# Patient Record
Sex: FEMALE | Race: WHITE | URBAN - METROPOLITAN AREA
[De-identification: names, ages, dates, MRNs, and addresses within clinical notes are randomized per-mention and may not be internally consistent; named-entity substitution may affect disease eponyms.]

---

## 2018-06-08 ENCOUNTER — OFFICE VISIT (OUTPATIENT)
Dept: INTERNAL MEDICINE CLINIC | Age: 58
End: 2018-06-08

## 2018-06-08 VITALS
DIASTOLIC BLOOD PRESSURE: 79 MMHG | WEIGHT: 134 LBS | SYSTOLIC BLOOD PRESSURE: 135 MMHG | OXYGEN SATURATION: 99 % | HEIGHT: 63 IN | HEART RATE: 66 BPM | TEMPERATURE: 97.8 F | BODY MASS INDEX: 23.74 KG/M2 | RESPIRATION RATE: 18 BRPM

## 2018-06-08 DIAGNOSIS — Z78.0 POSTMENOPAUSAL: ICD-10-CM

## 2018-06-08 DIAGNOSIS — Z01.84 IMMUNITY STATUS TESTING: ICD-10-CM

## 2018-06-08 DIAGNOSIS — Z12.31 SCREENING MAMMOGRAM, ENCOUNTER FOR: ICD-10-CM

## 2018-06-08 DIAGNOSIS — L40.9 PSORIASIS: ICD-10-CM

## 2018-06-08 DIAGNOSIS — L71.9 ROSACEA: Primary | ICD-10-CM

## 2018-06-08 RX ORDER — ESTRADIOL 0.04 MG/D
1 FILM, EXTENDED RELEASE TRANSDERMAL
COMMUNITY
End: 2018-06-08 | Stop reason: SDUPTHER

## 2018-06-08 RX ORDER — ESTRADIOL 0.04 MG/D
1 FILM, EXTENDED RELEASE TRANSDERMAL 2 TIMES WEEKLY
Qty: 8 PATCH | Refills: 5 | Status: SHIPPED | OUTPATIENT
Start: 2018-06-08 | End: 2018-12-27 | Stop reason: SDUPTHER

## 2018-06-08 RX ORDER — AMLODIPINE BESYLATE 2.5 MG/1
TABLET ORAL DAILY
COMMUNITY
End: 2018-06-08 | Stop reason: ALTCHOICE

## 2018-06-08 RX ORDER — METRONIDAZOLE 7.5 MG/G
CREAM TOPICAL 2 TIMES DAILY
COMMUNITY
End: 2018-06-08 | Stop reason: SDUPTHER

## 2018-06-08 RX ORDER — MYCOPHENOLATE MOFETIL 500 MG/1
500 TABLET ORAL 2 TIMES DAILY
COMMUNITY

## 2018-06-08 RX ORDER — PANTOPRAZOLE SODIUM 40 MG/1
40 TABLET, DELAYED RELEASE ORAL DAILY
COMMUNITY

## 2018-06-08 RX ORDER — METRONIDAZOLE 7.5 MG/G
CREAM TOPICAL 2 TIMES DAILY
Qty: 45 G | Refills: 5 | Status: SHIPPED | OUTPATIENT
Start: 2018-06-08

## 2018-06-08 RX ORDER — GABAPENTIN 300 MG/1
300 CAPSULE ORAL 3 TIMES DAILY
COMMUNITY

## 2018-06-08 NOTE — PATIENT INSTRUCTIONS
Request records from [facility] - Dr Lorrie Sandra rheumatologist - recent blood work done request records    Records of colonoscopy - Dr Mark Shepherd

## 2018-06-08 NOTE — MR AVS SNAPSHOT
[address] [facility] [address] [facility] 
[phone] Patient: Valentine Wilson MRN: [id number] RTZ:[id number] Visit Information Date & Time Provider Department Dept. Phone Encounter #  
 6/8/2018  3:00 PM Britni Malik, [facility] [phone] [id number] Follow-up Instructions Return in about 6 months (around 12/8/2018) for pap smear . Upcoming Health Maintenance Date Due Hepatitis C Screening 1960 DTaP/Tdap/Td series (1 - Tdap) 4/21/1980 PAP AKA CERVICAL CYTOLOGY 4/21/1980 BREAST CANCER SCRN MAMMOGRAM 4/21/2009 FOBT Q 1 YEAR AGE 50-75 4/21/2009 Influenza Age 5 to Adult 8/1/2018 Allergies as of 6/8/2018  Review Complete On: 6/8/2018 By: Katy Mckeon MD  
  
 Severity Noted Reaction Type Reactions Sulfa (Sulfonamide Antibiotics)  06/08/2018    Hives Current Immunizations  Never Reviewed No immunizations on file. Not reviewed this visit You Were Diagnosed With   
  
 Codes Comments Rosacea    -  Primary ICD-10-CM: L71.9 ICD-9-CM: 695.3 Psoriasis     ICD-10-CM: L40.9 ICD-9-CM: 696.1 Postmenopausal     ICD-10-CM: Z78.0 ICD-9-CM: V49.81 Screening mammogram, encounter for     ICD-10-CM: Z12.31 
ICD-9-CM: V76.12 Vitals BP Pulse Temp Resp Height(growth percentile) Weight(growth percentile) 135/79 (BP 1 Location: Right arm, BP Patient Position: Sitting) 66 97.8 °F (36.6 °C) (Oral) 18 5' 3\" (1.6 m) 134 lb (60.8 kg) SpO2 BMI OB Status Smoking Status 99% 23.74 kg/m2 Hysterectomy Never Smoker Vitals History BMI and BSA Data Body Mass Index Body Surface Area  
 23.74 kg/m 2 1.64 m 2 Preferred Pharmacy Pharmacy Name Phone [phone] [facility] [phone] Your Updated Medication List  
  
   
 This list is accurate as of 18  3:37 PM.  Always use your most recent med list.  
  
  
  
  
 CELLCEPT 500 mg tablet Generic drug:  mycophenolate Take 500 mg by mouth two (2) times a day. COSENTYX  mg/mL Pnij Generic drug:  secukinumab  
by SubCUTAneous route. estradiol 0.0375 mg/24 hr  
Commonly known as:  VIVELLE  
1 Patch by TransDERmal route two (2) times a week.  
  
 gabapentin 300 mg capsule Commonly known as:  NEURONTIN Take 300 mg by mouth three (3) times daily. metroNIDAZOLE 0.75 % topical cream  
Commonly known as:  METROCREAM  
Apply  to affected area two (2) times a day. Use a thin layer to affected areas after washing PROTONIX 40 mg tablet Generic drug:  pantoprazole Take 40 mg by mouth daily. Prescriptions Sent to Pharmacy Refills  
 estradiol (VIVELLE) 0.0375 mg/24 hr 5 Si Patch by TransDERmal route two (2) times a week. Class: Normal  
 Pharmacy: [address] Ph #: [phone] Route: TransDERmal  
 metroNIDAZOLE (METROCREAM) 0.75 % topical cream 5 Sig: Apply  to affected area two (2) times a day. Use a thin layer to affected areas after washing Class: Normal  
 Pharmacy: [address] Ph #: [phone] Route: Topical  
  
Follow-up Instructions Return in about 6 months (around 2018) for pap smear . To-Do List   
 2018 Imaging:  [facility] MAMMO BI SCREENING INCL CAD Patient Instructions Request records from [facility] - Dr Filemon Holland rheumatologist - recent blood work done request records Records of colonoscopy - Dr Adriane Lawler Introducing [facility]! Rebecca Garcia introduces [email or web address] patient portal. Now you can access parts of your medical record, email your doctor's office, and request medication refills online. 1. In your internet browser, go to [email or web address] 2. Click on the First Time User? Click Here link in the Sign In box. You will see the New Member Sign Up page. 3. Enter your Oony Access Code exactly as it appears below. You will not need to use this code after youve completed the sign-up process. If you do not sign up before the expiration date, you must request a new code. · Oony Access Code: [email or web address] Expires: 9/6/2018  3:37 PM 
 
4. Enter the last four digits of your Social Security Number (xxxx) and Date of Birth (mm/dd/yyyy) as indicated and click Submit. You will be taken to the next sign-up page. 5. Create a ProcessUnityt ID. This will be your Oony login ID and cannot be changed, so think of one that is secure and easy to remember. 6. Create a Oony password. You can change your password at any time. 7. Enter your Password Reset Question and Answer. This can be used at a later time if you forget your password. 8. Enter your e-mail address. You will receive e-mail notification when new information is available in [address] 9. Click Sign Up. You can now view and download portions of your medical record. 10. Click the Download Summary menu link to download a portable copy of your medical information. If you have questions, please visit the Frequently Asked Questions section of the Oony website. Remember, Oony is NOT to be used for urgent needs. For medical emergencies, dial 911. Now available from your iPhone and Android! Please provide this summary of care documentation to your next provider. Your primary care clinician is listed as MARCELO ELENA [address] [place] [address]. If you have any questions after today's visit, please call [phone].

## 2018-06-08 NOTE — PROGRESS NOTES
Reviewed record in preparation for visit and have obtained necessary documentation. Identified pt with two pt identifiers(name and ). Chief Complaint   Patient presents with   [facility] Maintenance Due   Topic Date Due    Hepatitis C Test  1960    DTaP/Tdap/Td  (1 - Tdap) 1980    Cervical Cancer Screening  1980    Breast Cancer Screening  2009    Stool testing for trace blood  2009       Ms. Abebe Redmond has a reminder for a \"due or due soon\" health maintenance. I have asked that she discuss this further with her primary care provider for follow-up on this health maintenance. Coordination of Care Questionnaire:  :     1) Have you been to an emergency room, urgent care clinic since your last visit? no   Hospitalized since your last visit? no             2) Have you seen or consulted any other health care providers outside of [address] [place] [address] since your last visit? no  (Include any pap smears or colon screenings in this section.)    3) In the event something were to happen to you and you were unable to speak on your behalf, do you have an Advance Directive/ Living Will in place stating your wishes? NO    Do you have an Advance Directive on file? no    4) Are you interested in receiving information on Advance Directives? NO    Patient is accompanied by self I have received verbal consent from Rosa Vogel to discuss any/all medical information while they are present in the room.

## 2018-06-08 NOTE — PROGRESS NOTES
Ms. Avelino Stanley is a new patient who is here to establish care. 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 Currently on Cellcept and cosentyx   Currently pain in joints is controlled with this therapy. Dr Atilio Padilla     Patient has rosacea  - currently on metronidazole cream     Postmenopausal symptoms, and painful sexual intercourse -     GERD stopped Protonix temporarily - and had severe GERD. 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    She had a colonoscopy done in [place] and were requesting records  Review of systems:  Constitutional: negative for fever, chills, weight loss, night sweats   Eyes : negative for vision changes, eye pain and discharge  Nose and Throat: negative for tinnitus, sore throat   Cardiovascular: negative for chest pain, palpitations and shortness of breath  Respiratory: negative for shortness of breath, cough and wheezing   Gastroinstestinal: negative for abdominal pain, nausea, vomiting, diarrhea, constipation, and blood in the stool  Musculoskeletal: See HPI  Genitourinary: negative for dysuria, nocturia, polyuria and hematuria   Neurologic: Negative for focal weakness, numbness or incoordination  Skin: negative for rash, pruritus  Hematologic: negative for easy bruising      Past Medical History:   Diagnosis Date    C. difficile diarrhea     Drug-induced lupus erythematosus     ramicade induced    HTN (hypertension)     Psoriasis     RA [facility]rhe[facility]oid arthritis) ([facility] [address]     Rosacea         History reviewed. No pertinent surgical history. Allergies   Allergen Reactions    Sulfa (Sulfonamide Antibiotics) Hives       No current outpatient prescriptions on file prior to visit. No current facility-administered medications on file prior to visit. family history includes Alcohol abuse in her mother; Cancer in her father. Social History     Social History    Marital status:      Spouse name: N/A    Number of children: N/A    Years of education: N/A     Occupational History    Not on file. Social History Main Topics    Smoking status: Never Smoker    Smokeless tobacco: Never Used    Alcohol use Yes    Drug use: No    Sexual activity: Yes     Partners: Male     Other Topics Concern    Not on file     Social History Narrative    No narrative on file       Visit Vitals    /79 (BP 1 Location: Right arm, BP Patient Position: Sitting)    Pulse 66    Temp 97.8 °F (36.6 °C) (Oral)    Resp 18    Ht 5' 3\" (1.6 m)    Wt 134 lb (60.8 kg)    SpO2 99%    BMI 23.74 kg/m2     General:  Well appearing female no acute distress  HEENT:   PERRL,normal conjunctiva. External ear and canals normal, TMs normal.  Hearing normal to voice. Nose without edema or discharge, normal septum. Lips, teeth, gums normal.  Oropharynx: no erythema, no exudates, no lesions, normal tongue. Neck:  Supple. Thyroid normal size, nontender, without nodules. No carotid bruit. No masses or lymphadenopathy  Respiratory: no respiratory distress,  no wheezing, no rhonchi, no rales. No chest wall tenderness. Cardiovascular:  RRR, normal S1S2, no murmur. Gastrointestinal: normal bowel sounds, soft, nontender, without masses. No hepatosplenomegaly. Extremities +2 pulses, no edema, normal sensation   Musculoskeletal:  Normal gait. Normal digits and nails. Normal strength and tone, no atrophy, and no abnormal movement. Skin:  No rash, no lesions, no ulcers. Skin warm, normal turgor, without induration or nodules. Neuro:  A and OX4, fluent speech, cranial nerves normal 2-12. Sensation normal to light touch.   DTR symmetrical  Psych:  Normal affect      No results found for: WBC, WBCLT, HGBPOC, HGB, HGBP, HCTPOC, HCT, PHCT, RBCH, PLT, MCV, HGBEXT, HCTEXT, PLTEXT, HGBEXT, HCTEXT, PLTEXT  No results found for: NA, K, CL, CO2, AGAP, GLU, BUN, CREA, BUCR, GFRAA, GFRNA, CA, GFRAA  No results found for: CHOL, CHOLPOCT, CHOLX, CHLST, CHOLV, HDL, HDLPOC, LDL, LDLCPOC, LDLC, DLDLP, VLDLC, VLDL, TGLX, TRIGL, TRIGP, TGLPOCT, CHHD, CHHDX  No results found for: TSH, TSH2, TSH3, TSHP, TSHEXT, TSHEXT  No results found for: HBA1C, HGBE8, WRP9MNZA, YID6TCSA, LPO2XYIA  No results found for: VITD3, XQVID2, XQVID3, XQVID, VD3RIA                Assessment and Plan:   65-year-old woman with history of rheumatoid arthritis, Remicade induced lupus, psoriasis presenting to establish care. 1. Rosacea  Patient has been using metronidazole cream for a long time and would like a refill.  - metroNIDAZOLE (METROCREAM) 0.75 % topical cream; Apply  to affected area two (2) times a day. Use a thin layer to affected areas after washing  Dispense: 45 g; Refill:    2. Autoimmune disease. Rheumatoid arthritis, Remicade and just lupus and psoriasis  Followed by rheumatologist at [address] [place] [address] I requested records  Patient is on CellCept and Cesentyx she had labs done last week      3. Postmenopausal  After menopause she had vaginal dryness causing pain during sexual intercourse which improved with estradiol. Patient has been on this for years. She knows that it increases her risk for heart disease and clots. She would like to continue with therapy. - estradiol (VIVELLE) 0.0375 mg/24 hr; 1 Patch by TransDERmal route two (2) times a week. Dispense: 8 Patch; Refill: 5    4. Screening mammogram, encounter for  - [facility] MAMMO BI SCREENING INCL CAD; Future    5. Immunity status testing  Needs testing for hep B  - HEP B SURFACE AB    Follow-up Disposition:  Return in about 6 months (around 12/8/2018) for pap smear .      Rajni Thompson MD

## 2018-06-09 LAB — HBV SURFACE AB SER QL: NON REACTIVE

## 2018-06-11 NOTE — PROGRESS NOTES
Hep B testing for immunity shows that patient is not immune to hep B and we need to repeat hep B vaccine.  Can make nurse appointment for vaccine

## 2018-10-08 ENCOUNTER — OFFICE VISIT (OUTPATIENT)
Dept: INTERNAL MEDICINE CLINIC | Age: 58
End: 2018-10-08

## 2018-10-08 ENCOUNTER — HOSPITAL ENCOUNTER (OUTPATIENT)
Dept: LAB | Age: 58
Discharge: HOME | End: 2018-10-08
Payer: COMMERCIAL

## 2018-10-08 VITALS
RESPIRATION RATE: 18 BRPM | TEMPERATURE: 97.8 F | OXYGEN SATURATION: 99 % | DIASTOLIC BLOOD PRESSURE: 92 MMHG | BODY MASS INDEX: 23.74 KG/M2 | HEART RATE: 67 BPM | SYSTOLIC BLOOD PRESSURE: 150 MMHG | WEIGHT: 134 LBS | HEIGHT: 63 IN

## 2018-10-08 DIAGNOSIS — Z01.419 WOMEN'S ANNUAL ROUTINE GYNECOLOGICAL EXAMINATION: Primary | ICD-10-CM

## 2018-10-08 DIAGNOSIS — Z12.31 SCREENING MAMMOGRAM, ENCOUNTER FOR: ICD-10-CM

## 2018-10-08 DIAGNOSIS — Z11.1 SCREENING FOR TUBERCULOSIS: ICD-10-CM

## 2018-10-08 DIAGNOSIS — Z13.220 SCREENING FOR CHOLESTEROL LEVEL: ICD-10-CM

## 2018-10-08 DIAGNOSIS — Z11.59 NEED FOR HEPATITIS C SCREENING TEST: ICD-10-CM

## 2018-10-08 PROBLEM — M32.9 SYSTEMIC LUPUS ERYTHEMATOSUS (HCC): Status: ACTIVE | Noted: 2018-10-08

## 2018-10-08 PROCEDURE — 87624 HPV HI-RISK TYP POOLED RSLT: CPT

## 2018-10-08 PROCEDURE — 88175 CYTOPATH C/V AUTO FLUID REDO: CPT

## 2018-10-08 NOTE — PROGRESS NOTES
Reviewed record in preparation for visit and have obtained necessary documentation. Identified pt with two pt identifiers(name and ). Chief Complaint Patient presents with  Well Woman  Immunization/Injection  
  tb, hep Health Maintenance Due Topic Date Due  
 Hepatitis C Test  1960  Cervical Cancer Screening  1980  Shingles Vaccine (1 of 2) 2009  Breast Cancer Screening  2009  Stool testing for trace blood  2009 Ms. Lurdes Duncan has a reminder for a \"due or due soon\" health maintenance. I have asked that she discuss this further with her primary care provider for follow-up on this health maintenance. Coordination of Care Questionnaire: 
:  
 
1) Have you been to an emergency room, urgent care clinic since your last visit? no  
Hospitalized since your last visit? no          
 
2) Have you seen or consulted any other health care providers outside of [address] [place] [address] since your last visit? no  (Include any pap smears or colon screenings in this section.) 3) In the event something were to happen to you and you were unable to speak on your behalf, do you have an Advance Directive/ Living Will in place stating your wishes? NO Do you have an Advance Directive on file? no 
 
4) Are you interested in receiving information on Advance Directives? NO Patient is accompanied by self I have received verbal consent from Cici Bobby to discuss any/all medical information while they are present in the room.

## 2018-10-08 NOTE — LETTER
NOTIFICATION  
 
10/8/2018 10:23 AM 
 
Ms. Cici Bobby [address] [place] [address] [address] [address] To Whom It May Concern: 
 
Cici Bboby is currently under the care of [facility]. Patient has completed 5 vaccines for hepatitis B in the past. Unfortunately patient does not seroconvert to immune status. Patient has a history of autoimmune disease ( Sjogren's and Lupus) and it has been reported that this subset of patients do not develop antibodies to hepatitis B vaccine. At this point I do not recommend further vaccination for Hepatitis B. If there are questions or concerns please have the patient contact our office. Sincerely, Holly Sweet MD

## 2018-10-08 NOTE — PROGRESS NOTES
Ms. Marvin Todd is presenting to follow up on chronic medical issues CC: 
Well Woman and Immunization/Injection (tb, hep) HPI: 
 
Saw dr Art Jimenez and plans to have colonoscopy 2019 Had abnormal cells in the past  
Sexually active one partner, and has a hx of vaginal dryness which improves with estradiol patch She has had 5 vaccinations for Hep B but has not seroconverted. Advised against further vaccination with Hep B. She has a hx of autoimmune disorder - lupus and Sjogren and psoriasis. Requesting a letter for work stating patient no longer needs to be vaccinated for Hep B She had a colonoscopy done in [place] unfortunately I dont have records - previously requested She saw Dr Art Jimenez and plans to have colonoscopy next year Had hysterectomy / partial 
 
Review of systems: 
 
10 systems reviewed and negative other than HPI Past Medical History:  
Diagnosis Date  C. difficile diarrhea  Drug-induced lupus erythematosus   
 ramicade induced  HTN (hypertension)  Psoriasis  RA (rheumatoid arthritis) ([facility] [address]  Rosacea Past Surgical History:  
Procedure Laterality Date  HX PARTIAL HYSTERECTOMY  2013  
 had fibroids Allergies Allergen Reactions  Sulfa (Sulfonamide Antibiotics) Hives Current Outpatient Prescriptions on File Prior to Visit Medication Sig Dispense Refill  mycophenolate (CELLCEPT) 500 mg tablet Take 500 mg by mouth two (2) times a day.  secukinumab (COSENTYX PEN) 150 mg/mL pnij by SubCUTAneous route.  gabapentin (NEURONTIN) 300 mg capsule Take 300 mg by mouth three (3) times daily.  pantoprazole (PROTONIX) 40 mg tablet Take 40 mg by mouth daily.  estradiol (VIVELLE) 0.0375 mg/24 hr 1 Patch by TransDERmal route two (2) times a week. 8 Patch 5  
 metroNIDAZOLE (METROCREAM) 0.75 % topical cream Apply  to affected area two (2) times a day.  Use a thin layer to affected areas after washing 45 g 5  
 
 No current facility-administered medications on file prior to visit. family history includes Alcohol abuse in her mother; Cancer in her father. Social History Social History  Marital status:  Spouse name: N/A  
 Number of children: N/A  
 Years of education: N/A Occupational History  Not on file. Social History Main Topics  Smoking status: Never Smoker  Smokeless tobacco: Never Used  Alcohol use Yes  Drug use: No  
 Sexual activity: Yes  
  Partners: Male Other Topics Concern  Not on file Social History Narrative Visit Vitals  BP (!) 150/92 (BP 1 Location: Right arm, BP Patient Position: Sitting)  Pulse 67  Temp 97.8 °F (36.6 °C) (Oral)  Resp 18  Ht 5' 3\" (1.6 m)  Wt 134 lb (60.8 kg)  SpO2 99%  BMI 23.74 kg/m2 General:  Well appearing female no acute distress HEENT:   PERRL,normal conjunctiva. External ear and canals normal, TMs normal.  Hearing normal to voice. Nose without edema or discharge, normal septum. Lips, teeth, gums normal.  Oropharynx: no erythema, no exudates, no lesions, normal tongue. Neck:  Supple. Thyroid normal size, nontender, without nodules. No carotid bruit. No masses or lymphadenopathy Respiratory: no respiratory distress,  no wheezing, no rhonchi, no rales. No chest wall tenderness. Cardiovascular:  RRR, normal S1S2, no murmur. Gastrointestinal: normal bowel sounds, soft, nontender, without masses. No hepatosplenomegaly. Extremities +2 pulses, no edema, normal sensation Musculoskeletal:  Normal gait. Normal digits and nails. Normal strength and tone, no atrophy, and no abnormal movement. Skin:  No rash, no lesions, no ulcers. Skin warm, normal turgor, without induration or nodules. Neuro:  A and OX4, fluent speech, cranial nerves normal 2-12. Sensation normal to light touch. DTR symmetrical 
Psych:  Normal affect Pelvic- normal appearing external genitalia, speculum normal appearing cervix, no abnormal discharge, pap done. Bimanual exam, no cervical motion tenderness, no adnexal tenderness or masses. Assessment and Plan:  
58-year-old woman with history of rheumatoid arthritis, Remicade induced lupus, psoriasis presenting to for womans exam  
 
 1. Women's annual routine gynecological examination 
- PAP IG, APTIMA HPV AND RFX 16/18,45 (200020) 2. Screening for tuberculosis - QUANTIFERON-TB GOLD PLUS 3. Need for hepatitis C screening test 
- HEPATITIS C AB 4. Screening mammogram, encounter for - [facility] MAMMO BI SCREENING INCL CAD; Future 5. Screening for cholesterol level - LIPID PANEL 6. Autoimmune disease Remicade and just lupus and psoriasis Followed by rheumatologist at [facility] I reviewed records and advised to continue current therapy Patient is on CellCept and Cesentyx she had labs done last week 7. Postmenopausal 
After menopause she had vaginal dryness causing pain during sexual intercourse which improved with estradiol. Patient has been on this for years. She knows that it increases her risk for heart disease and clots. She would like to continue with therapy. - estradiol (VIVELLE) 0.0375 mg/24 hr; 1 Patch by TransDERmal route two (2) times a week. Dispense: 8 Patch; Refill: 5 
 
8. Screening mammogram, encounter for - [facility] MAMMO BI SCREENING INCL CAD; Future Will have colonoscopy in 2019 with Dr Jayne Anguiano Follow-up Disposition: 
Return in about 1 year (around 10/8/2019) for general exam . Nba Barrientos MD

## 2018-10-09 LAB
CHOLEST SERPL-MCNC: 197 MG/DL (ref 100–199)
HCV AB S/CO SERPL IA: <0.1 S/CO RATIO (ref 0–0.9)
HDLC SERPL-MCNC: 86 MG/DL
LDLC SERPL CALC-MCNC: 95 MG/DL (ref 0–99)
TRIGL SERPL-MCNC: 81 MG/DL (ref 0–149)
VLDLC SERPL CALC-MCNC: 16 MG/DL (ref 5–40)

## 2018-10-11 ENCOUNTER — HOSPITAL ENCOUNTER (OUTPATIENT)
Dept: MAMMOGRAPHY | Age: 58
Discharge: HOME | End: 2018-10-11
Attending: INTERNAL MEDICINE
Payer: COMMERCIAL

## 2018-10-11 DIAGNOSIS — Z12.31 SCREENING MAMMOGRAM, ENCOUNTER FOR: ICD-10-CM

## 2018-10-11 PROCEDURE — 77067 SCR MAMMO BI INCL CAD: CPT

## 2018-10-20 LAB
GAMMA INTERFERON BACKGROUND BLD IA-ACNC: 0.03 IU/ML
M TB IFN-G BLD-IMP: NEGATIVE
M TB IFN-G CD4+ BCKGRND COR BLD-ACNC: 0.04 IU/ML
MITOGEN IGNF BLD-ACNC: 0.58 IU/ML
QUANTIFERON INCUBATION, QF1T: NORMAL
QUANTIFERON TB2 AG: 0.03 IU/ML
SERVICE CMNT-IMP: NORMAL

## 2018-12-26 DIAGNOSIS — Z78.0 POSTMENOPAUSAL: ICD-10-CM

## 2018-12-27 DIAGNOSIS — Z78.0 POSTMENOPAUSAL: ICD-10-CM

## 2018-12-27 RX ORDER — ESTRADIOL 0.04 MG/D
1 FILM, EXTENDED RELEASE TRANSDERMAL 2 TIMES WEEKLY
Qty: 8 PATCH | Refills: 5 | Status: SHIPPED | OUTPATIENT
Start: 2018-12-28 | End: 2019-06-06 | Stop reason: SDUPTHER

## 2018-12-27 RX ORDER — ESTRADIOL 0.04 MG/D
FILM, EXTENDED RELEASE TRANSDERMAL
Qty: 8 PATCH | Refills: 5 | Status: SHIPPED | OUTPATIENT
Start: 2018-12-27 | End: 2019-06-06 | Stop reason: SDUPTHER

## 2019-06-06 DIAGNOSIS — Z78.0 POSTMENOPAUSAL: ICD-10-CM

## 2019-06-06 RX ORDER — ESTRADIOL 0.04 MG/D
1 FILM, EXTENDED RELEASE TRANSDERMAL
Qty: 8 PATCH | Refills: 5 | Status: SHIPPED | OUTPATIENT
Start: 2019-06-10 | End: 2019-08-07 | Stop reason: SDUPTHER

## 2019-08-07 DIAGNOSIS — Z78.0 POSTMENOPAUSAL: ICD-10-CM

## 2019-08-08 RX ORDER — ESTRADIOL 0.04 MG/D
1 FILM, EXTENDED RELEASE TRANSDERMAL
Qty: 8 PATCH | Refills: 5 | Status: SHIPPED | OUTPATIENT
Start: 2019-08-12 | End: 2019-08-20 | Stop reason: SDUPTHER

## 2019-08-20 DIAGNOSIS — Z78.0 POSTMENOPAUSAL: ICD-10-CM

## 2019-08-20 RX ORDER — ESTRADIOL 0.04 MG/D
1 FILM, EXTENDED RELEASE TRANSDERMAL
Qty: 8 PATCH | Refills: 5 | Status: SHIPPED | OUTPATIENT
Start: 2019-08-26 | End: 2020-09-09

## 2019-08-20 NOTE — TELEPHONE ENCOUNTER
PCP: Montana Jay MD    Last appt: 10/8/2018  No future appointments. Requested Prescriptions     Pending Prescriptions Disp Refills    estradiol (VIVELLE) 0.0375 mg/24 hr 8 Patch 5     Si Patch by TransDERmal route every Monday.

## 2019-09-12 ENCOUNTER — TELEPHONE (OUTPATIENT)
Dept: INTERNAL MEDICINE CLINIC | Age: 59
End: 2019-09-12

## 2019-09-12 NOTE — TELEPHONE ENCOUNTER
----- Message from Israel Matta sent at 9/12/2019 10:47 AM EDT -----  Regarding: Dr. Remington Caraballo: [phone]  Appointment Request From: Desi Mike    With Provider: Luke Hyde MD [facility]]    Preferred Date Range: 9/27/2019 - 10/1/2019    Preferred Times: Any time    Reason for visit: Request an Appointment    Comments:  Annual Check-up, Some Back Pain, GERD with sore throat.       Copy/paste envera

## 2020-04-03 ENCOUNTER — VIRTUAL VISIT (OUTPATIENT)
Dept: INTERNAL MEDICINE CLINIC | Age: 60
End: 2020-04-03

## 2020-04-03 DIAGNOSIS — N95.9 POST MENOPAUSAL PROBLEMS: ICD-10-CM

## 2020-04-03 DIAGNOSIS — Z78.0 POSTMENOPAUSAL: Primary | ICD-10-CM

## 2020-04-03 DIAGNOSIS — L71.9 ROSACEA: ICD-10-CM

## 2020-04-03 DIAGNOSIS — Z12.39 SCREENING FOR BREAST CANCER: ICD-10-CM

## 2020-04-03 NOTE — PROGRESS NOTES
Ms. 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 Currently on Cellcept and cosentyx   Denies joint pain this therapy is working well for patient   Dr Jody Newberry is following patient     Patient has rosacea  - currently on metronidazole cream     Postmenopausal symptoms, well controlled with the estradiol patch. [place] of increase risk of clots with this medication     GERD stopped Protonix temporarily - and had severe GERD. 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 difficile diarrhea     Drug-induced lupus erythematosus     ramicade induced    HTN (hypertension)     Psoriasis     RA [facility]rheumatoid [facility]) [facility] [address]     Rosacea         Past Surgical History:   Procedure Laterality Date    HX PARTIAL HYSTERECTOMY  2013    had fibroids       Allergies   Allergen Reactions    Diphenhydramine Hcl Palpitations    Sulfa (Sulfonamide Antibiotics) Hives and Rash       Current Outpatient Medications on File Prior to Visit   Medication Sig Dispense Refill    estradiol (VIVELLE) 0.0375 mg/24 hr 1 Patch by TransDERmal route every Monday. 8 Patch 5    mycophenolate (CELLCEPT) 500 mg tablet Take 500 mg by mouth two (2) times a day.  secukinumab (COSENTYX PEN) 150 mg/mL pnij by SubCUTAneous route.  gabapentin (NEURONTIN) 300 mg capsule Take 300 mg by mouth three (3) times daily.       pantoprazole (PROTONIX) 40 mg tablet Take 40 mg by mouth daily.  metroNIDAZOLE (METROCREAM) 0.75 % topical cream Apply  to affected area two (2) times a day. Use a thin layer to affected areas after washing 45 g 5     No current facility-administered medications on file prior to visit. family history includes Alcohol abuse in her mother; Cancer in her father. Social History     Socioeconomic History    Marital status:      Spouse name: Not on file    Number of children: Not on file    Years of education: Not on file    Highest education level: Not on file   Occupational History    Not on file   Social Needs    Financial resource strain: Not on file    Food insecurity     Worry: Not on file     Inability: Not on file    Transportation needs     Medical: Not on file     Non-medical: Not on file   Tobacco Use    Smoking status: Never Smoker    Smokeless tobacco: Never Used   Substance and Sexual Activity    Alcohol use: Yes    Drug use: No    Sexual activity: Yes     Partners: Male   Lifestyle    Physical activity     Days per week: Not on file     Minutes per session: Not on file    Stress: Not on file   Relationships    Social connections     Talks on phone: Not on file     Gets together: Not on file     Attends [employer] service: Not on file     Active member of club or organization: Not on file     Attends meetings of clubs or organizations: Not on file     Relationship status: Not on file    Intimate partner violence     Fear of current or ex partner: Not on file     Emotionally abused: Not on file     Physically abused: Not on file     Forced sexual activity: Not on file   Other Topics Concern    Not on file   Social History Narrative    Not on file       There were no vitals taken for this visit. General:  Well appearing female no acute distress  HEENT:   normal conjunctiva. External ear normal  Neck:  Supple.    Respiratory: no respiratory distress,  Speaking in full sentences    Extremities  no edema  Musculoskeletal:  Normal gait  Skin:  No visible  rash  Neuro:  A and OX4, fluent speech, cranial nerves grossly intact    Psych:  Normal affect      No results found for: WBC, WBCLT, HGBPOC, HGB, HGBP, HCTPOC, HCT, PHCT, RBCH, PLT, MCV, HGBEXT, HCTEXT, PLTEXT, HGBEXT, HCTEXT, PLTEXT  No results found for: NA, K, CL, CO2, AGAP, GLU, BUN, CREA, BUCR, GFRAA, GFRNA, CA, GFRAA  Lab Results   Component Value Date/Time    Cholesterol, total 197 10/08/2018 10:47 AM    HDL Cholesterol 86 10/08/2018 10:47 AM    LDL, calculated 95 10/08/2018 10:47 AM    VLDL, calculated 16 10/08/2018 10:47 AM    Triglyceride 81 10/08/2018 10:47 AM     No results found for: TSH, TSH2, TSH3, TSHP, TSHEXT, TSHEXT  No results found for: HBA1C, HGBE8, IIU2QYLI, GLT8RAFM, TUC7FZMU  No results found for: VITD3, XQVID2, XQVID3, XQVID, VD3RIA                Assessment and Plan:   59-year-old woman with history of rheumatoid arthritis, Remicade induced lupus, psoriasis presenting to follow up   1. Rosacea  Patient has been using metronidazole cream for a long time with good results continue current therapy with - metroNIDAZOLE (METROCREAM) 0.75 % topical cream; Apply  to affected area two (2) times a day. 2.  Autoimmune disease. Rheumatoid arthritis, Remicade induced lupus and psoriasis  Followed by [facility]ologist at [facility] I requested records  Patient is on CellCept and Cesentyx she had labs done recently will request records       3. Postmenopausal  After menopause she had vaginal dryness causing pain during sexual intercourse which improved with estradiol. Patient has been on this for years. She knows that it increases her risk for heart disease and clots. She would like to continue with therapy. - estradiol (VIVELLE) 0.0375 mg/24 hr; 1 Patch by TransDERmal route two (2) times a week. 4. Screening mammogram, encounter for  - [facility] MAMMO BI SCREENING INCL CAD;  Future         This is an established visit conducted via real time video and audio telemedicine. The patient has been instructed that this meets HIPAA criteria and acknowledges and agrees to this method of visitation.     Reynaldo Mendez MD  04/03/20  11:49 AM    Reynaldo Mendez MD

## 2020-04-03 NOTE — PATIENT INSTRUCTIONS
Request records from [facility] - Dr Loan Sandoval rheumatologist - recent blood work done request records 
  
Records of colonoscopy - Dr Arsen Vance

## 2020-04-03 NOTE — PROGRESS NOTES
Reviewed record in preparation for visit and have obtained necessary documentation. Identified pt with two pt identifiers(name and ). Chief Complaint   Patient presents with    Follow-up       Health Maintenance Due   Topic Date Due    Colonoscopy  1977    Shingles Vaccine (1 of 2) 2009       Ms. Jody Rocha has a reminder for a \"due or due soon\" health maintenance. I have asked that she discuss this further with her primary care provider for follow-up on this health maintenance. Coordination of Care Questionnaire:  :     1) Have you been to an emergency room, urgent care clinic since your last visit? no   Hospitalized since your last visit? no             2) Have you seen or consulted any other health care providers outside of [address] [place] [address] since your last visit? no  (Include any pap smears or colon screenings in this section.)    3) In the event something were to happen to you and you were unable to speak on your behalf, do you have an Advance Directive/ Living Will in place stating your wishes? NO    Do you have an Advance Directive on file? no    4) Are you interested in receiving information on Advance Directives? NO    Patient is accompanied by self I have received verbal consent from Sendy Tyson to discuss any/all medical information while they are present in the room.

## 2020-09-09 DIAGNOSIS — Z78.0 POSTMENOPAUSAL: ICD-10-CM

## 2020-09-09 RX ORDER — ESTRADIOL 0.04 MG/D
PATCH TRANSDERMAL
Qty: 4 PATCH | Refills: 11 | Status: SHIPPED | OUTPATIENT
Start: 2020-09-09 | End: 2021-01-05 | Stop reason: SDUPTHER

## 2020-10-19 NOTE — PROGRESS NOTES
HISTORY OF PRESENT ILLNESS  Miriam Friedman is a 63 y.o. female. HPI     Pt normally follows with Dr Hanane Silva. Pt is here for acute care. This is an established visit completed with telemedicine was completed with video assist  the patient acknowledges and agrees to this method of visitation doxyme    She is currently in [place], she will be coming home tomorrow     She c/o elevated bp  Around 2-3 weeks ago she noticed facial flushing,minor Has  BP have been 1170/96 138/70 118/78 120/103  She has been steady 130-140s/90s   She was on bystolic 5 mg for BP for 3 years and came off of it ~2018  Will restart bystolic 5 mg 1/2 tab -- discussed if she is still having higher reading she can take full tab     Wt was 134 lbs lov    Reviewed labs  Ordered labs     She sees Dr. Rebecca Laurent (rheum) for RA and lupus for psoriatic arthritis is well  She is on cellcept and cosentyx has had blood work drawn there as well    She has protonix 40 mg for gerd controls her symptoms works well    She has gabapentin 300 mg for neuropathy and spasms, this works well     PREVENTIVE:  Lipids: 10/18 95        Patient Active Problem List    Diagnosis Date Noted    Systemic lupus erythematosus ([facility] [address] 10/08/2018    RA (rheumatoid arthritis) ([facility] [address]     Psoriasis     HTN (hypertension)     Rosacea      Current Outpatient Medications   Medication Sig Dispense Refill    nebivoloL (BYSTOLIC) 5 mg tablet Take 1 Tab by mouth daily. 30 Tab 2    estradioL (CLIMARA) 0.0375 mg/24 hr APPLY 1 PATCH EVERY MONDAY 4 Patch 11    mycophenolate (CELLCEPT) 500 mg tablet Take 500 mg by mouth two (2) times a day.  secukinumab (COSENTYX PEN) 150 mg/mL pnij by SubCUTAneous route.  gabapentin (NEURONTIN) 300 mg capsule Take 300 mg by mouth three (3) times daily.  pantoprazole (PROTONIX) 40 mg tablet Take 40 mg by mouth daily.  metroNIDAZOLE (METROCREAM) 0.75 % topical cream Apply  to affected area two (2) times a day.  Use a thin layer to affected areas after washing 45 g 5     Past Surgical History:   Procedure Laterality Date    HX PARTIAL HYSTERECTOMY  2013    had fibroids      No results found for: WBC, WBCT, WBCPOC, HGB, HGBPOC, HCT, HCTPOC, PLT, PLTPOC, MCV, MCVPOC, HGBEXT, HCTEXT, PLTEXT, HGBEXT, HCTEXT, PLTEXT  Lab Results   Component Value Date/Time    Cholesterol, total 197 10/08/2018 10:47 AM    HDL Cholesterol 86 10/08/2018 10:47 AM    LDL, calculated 95 10/08/2018 10:47 AM    Triglyceride 81 10/08/2018 10:47 AM     No results found for: GFRNA, GFRNAPOC, GFRAA, GFRAAPOC, CREA, CREAPOC, BUN, IBUN, BUNPOC, NA, NAPOC, K, KPOCT, CL, CLPOC, CO2, CO2POC, MG, PHOS, ALBEU, PTH, PTHILT, EPO     Review of Systems   Respiratory: Negative for shortness of breath. Cardiovascular: Negative for chest pain. Physical Exam  Constitutional:       General: She is not in acute distress. Appearance: Normal appearance. She is not ill-appearing, toxic-appearing or diaphoretic. HENT:      Head: Normocephalic and atraumatic. Eyes:      General:         Right eye: No discharge. Left eye: No discharge. Conjunctiva/sclera: Conjunctivae normal.   Pulmonary:      Effort: No respiratory distress. Neurological:      Mental Status: She is alert and oriented to person, place, and time. Mental status is at baseline. Gait: Gait normal.   Psychiatric:         Mood and Affect: Mood normal.         Behavior: Behavior normal.         ASSESSMENT and PLAN    ICD-10-CM ICD-9-CM    1.  Essential hypertension       Patient reports past history of hypertension    Blood pressure medications were stopped about 2 years ago    She has been having progressive elevation of blood pressure and facial flushing associated with this    We will restart Bystolic 5 mg daily    We will start with just 1/2 tablet which is 2.5 mg daily as some of her blood pressures have been in the normal range though the majority have been elevated    Continue to closely monitor blood pressure at home    Repeat basic labs evaluating thyroid function metabolic function and blood counts   I10 401.9 LIPID PANEL      METABOLIC PANEL, COMPREHENSIVE      CBC W/O DIFF      TSH 3RD GENERATION      HEMOGLOBIN A1C WITH EAG   2. Systemic lupus erythematosus, unspecified SLE type, unspecified organ involvement status ([facility] [address]  M32.9 710.0 LIPID PANEL   No recent changes to this stable on CellCept and Cosentyx   METABOLIC PANEL, COMPREHENSIVE      CBC W/O DIFF      TSH 3RD GENERATION      HEMOGLOBIN A1C WITH EAG   3. Mixed hyperlipidemia  E78.2 272.2 LIPID PANEL   Check lipids with rest of labs diet controlled   METABOLIC PANEL, COMPREHENSIVE      CBC W/O DIFF      TSH 3RD GENERATION      HEMOGLOBIN A1C WITH EAG   4. Neuropathy       Improved with gabapentin G62.9 355.9    5. Gastroesophageal reflux disease without esophagitis       Controlled Protonix K21.9 530.81            Scribed by Ada Levinethan, as dictated by Dr. Nelsy Byers. Current diagnosis and concerns discussed with pt at length. Pt understands risks and benefits or current treatment plan and medications, and accepts the treatment and medication with any possible risks. Pt asks appropriate questions, which were answered. Pt was instructed to call with any concerns or problems. I have reviewed the note documented by the scribe. The services provided are my own. The documentation is accurate     Addis Crespo, who was evaluated through a synchronous (real-time) audio-video encounter, and/or her healthcare decision maker, is aware that it is a billable service, with coverage as determined by her insurance carrier. She provided verbal consent to proceed: Yes, and patient identification was verified. It was conducted pursuant to the emergency declaration under the [address] [place] [address], [address] [place] [address] authority and the [employer] General Act.  A caregiver was present when appropriate. Ability to conduct physical exam was limited. I was in the office. The patient was at home.

## 2020-10-21 ENCOUNTER — VIRTUAL VISIT (OUTPATIENT)
Dept: INTERNAL MEDICINE CLINIC | Age: 60
End: 2020-10-21

## 2020-10-21 DIAGNOSIS — E78.2 MIXED HYPERLIPIDEMIA: ICD-10-CM

## 2020-10-21 DIAGNOSIS — K21.9 GASTROESOPHAGEAL REFLUX DISEASE WITHOUT ESOPHAGITIS: ICD-10-CM

## 2020-10-21 DIAGNOSIS — I10 ESSENTIAL HYPERTENSION: Primary | ICD-10-CM

## 2020-10-21 DIAGNOSIS — G62.9 NEUROPATHY: ICD-10-CM

## 2020-10-21 DIAGNOSIS — M32.9 SYSTEMIC LUPUS ERYTHEMATOSUS, UNSPECIFIED SLE TYPE, UNSPECIFIED ORGAN INVOLVEMENT STATUS (HCC): ICD-10-CM

## 2020-10-21 PROCEDURE — 99214 OFFICE O/P EST MOD 30 MIN: CPT | Performed by: INTERNAL MEDICINE

## 2020-10-21 RX ORDER — NEBIVOLOL 5 MG/1
5 TABLET ORAL DAILY
Qty: 30 TAB | Refills: 2 | Status: SHIPPED | OUTPATIENT
Start: 2020-10-21 | End: 2021-03-18 | Stop reason: SDUPTHER

## 2020-10-27 LAB
ALBUMIN SERPL-MCNC: 4.3 G/DL (ref 3.8–4.8)
ALBUMIN/GLOB SERPL: 2 {RATIO} (ref 1.2–2.2)
ALP SERPL-CCNC: 49 IU/L (ref 39–117)
ALT SERPL-CCNC: 10 IU/L (ref 0–32)
AST SERPL-CCNC: 15 IU/L (ref 0–40)
BILIRUB SERPL-MCNC: 0.4 MG/DL (ref 0–1.2)
BUN SERPL-MCNC: 17 MG/DL (ref 8–27)
BUN/CREAT SERPL: 18 (ref 12–28)
CALCIUM SERPL-MCNC: 9.4 MG/DL (ref 8.7–10.3)
CHLORIDE SERPL-SCNC: 103 MMOL/L (ref 96–106)
CHOLEST SERPL-MCNC: 182 MG/DL (ref 100–199)
CO2 SERPL-SCNC: 24 MMOL/L (ref 20–29)
CREAT SERPL-MCNC: 0.93 MG/DL (ref 0.57–1)
ERYTHROCYTE [DISTWIDTH] IN BLOOD BY AUTOMATED COUNT: 11.8 % (ref 11.7–15.4)
EST. AVERAGE GLUCOSE BLD GHB EST-MCNC: 105 MG/DL
GLOBULIN SER CALC-MCNC: 2.2 G/DL (ref 1.5–4.5)
GLUCOSE SERPL-MCNC: 88 MG/DL (ref 65–99)
HBA1C MFR BLD: 5.3 % (ref 4.8–5.6)
HCT VFR BLD AUTO: 40.1 % (ref 34–46.6)
HDLC SERPL-MCNC: 87 MG/DL
HGB BLD-MCNC: 13 G/DL (ref 11.1–15.9)
LDLC SERPL CALC-MCNC: 81 MG/DL (ref 0–99)
MCH RBC QN AUTO: 28.6 PG (ref 26.6–33)
MCHC RBC AUTO-ENTMCNC: 32.4 G/DL (ref 31.5–35.7)
MCV RBC AUTO: 88 FL (ref 79–97)
PLATELET # BLD AUTO: 330 X10E3/UL (ref 150–450)
POTASSIUM SERPL-SCNC: 4.2 MMOL/L (ref 3.5–5.2)
PROT SERPL-MCNC: 6.5 G/DL (ref 6–8.5)
RBC # BLD AUTO: 4.54 X10E6/UL (ref 3.77–5.28)
SODIUM SERPL-SCNC: 140 MMOL/L (ref 134–144)
TRIGL SERPL-MCNC: 76 MG/DL (ref 0–149)
TSH SERPL DL<=0.005 MIU/L-ACNC: 3.07 UIU/ML (ref 0.45–4.5)
VLDLC SERPL CALC-MCNC: 14 MG/DL (ref 5–40)
WBC # BLD AUTO: 3 X10E3/UL (ref 3.4–10.8)

## 2020-12-01 NOTE — PROGRESS NOTES
HISTORY OF PRESENT ILLNESS  Paulette Miguel is a 63 y.o. female. HPI     Pt normally follows with Dr Tacho Collins. Last here 10/21/20.    This is an established visit completed with telemedicine was completed with video assist  the patient acknowledges and agrees to this method of visitation [email or web address]     lov She was in [place], she is home now      She c/o facial flushing x 1.5 months  Denies fever, she feel as if her face is burning  This has been going on since BP have been elevated  Discussed f/u with Dr. Sindi Kennedy   Denies feeling anxiety   Discussed hot flashes and paxil as tx   Discussed increasing gabapentin to BID to see if this helps     Lov, she c/o elevated bp  Lov, restarted bystolic 5 mg 1/2 tab     She took the bystolic for a week  BP at home 140/90 120/70 112/60 - she was been checking everyday  She didn't have any sxs of low bp taking this  The HA that she had from before mainly resolved   She stopped taking the bystolic because she was worried about low Bps  Bps have still been all over the face, HA are stable   Discussed restarting bystolic and fluctuations are nl   Discussed paying attention for low bp sxs   She will bring her cuff in next office visit to check for accuracy       Wt was 134 lbs lov     Reviewed labs     She sees Dr. Sindi Kennedy (rheum) for RA and lupus for psoriatic arthritis is well  Last visit spring 2020  She is on cellcept and cosentyx    Discussed f/u for flushing sxs     She has protonix 40 mg for gerd controls her symptoms works well     She has gabapentin 300 mg for neuropathy and spasms, this works well      PREVENTIVE:  Lipids: 10/20 81  A1c: 10/20 5.5  Flu: 10/20    Patient Active Problem List    Diagnosis Date Noted    Systemic lupus erythematosus ([facility] [address] 10/08/2018    RA (rheumatoid arthritis) ([facility] [address]     Psoriasis     HTN (hypertension)     Rosacea      Current Outpatient Medications   Medication Sig Dispense Refill    mycophenolate (CELLCEPT) 500 mg tablet Take 500 mg by mouth two (2) times a day.  secukinumab (COSENTYX PEN) 150 mg/mL pnij by SubCUTAneous route.  gabapentin (NEURONTIN) 300 mg capsule Take 300 mg by mouth three (3) times daily.  pantoprazole (PROTONIX) 40 mg tablet Take 40 mg by mouth daily.  nebivoloL (BYSTOLIC) 5 mg tablet Take 1 Tab by mouth daily. 30 Tab 2    estradioL (CLIMARA) 0.0375 mg/24 hr APPLY 1 PATCH EVERY MONDAY 4 Patch 11    metroNIDAZOLE (METROCREAM) 0.75 % topical cream Apply  to affected area two (2) times a day. Use a thin layer to affected areas after washing 45 g 5     Past Surgical History:   Procedure Laterality Date    HX PARTIAL HYSTERECTOMY  2013    had fibroids      Lab Results   Component Value Date/Time    WBC 3.0 (L) 10/26/2020 08:00 AM    HGB 13.0 10/26/2020 08:00 AM    HCT 40.1 10/26/2020 08:00 AM    PLATELET 856 25/66/8708 08:00 AM    MCV 88 10/26/2020 08:00 AM     Lab Results   Component Value Date/Time    Cholesterol, total 182 10/26/2020 08:00 AM    HDL Cholesterol 87 10/26/2020 08:00 AM    LDL, calculated 95 10/08/2018 10:47 AM    LDL Chol Calc (NIH) 81 10/26/2020 08:00 AM    Triglyceride 76 10/26/2020 08:00 AM     Lab Results   Component Value Date/Time    GFR est non-AA 67 10/26/2020 08:00 AM    GFR est AA 77 10/26/2020 08:00 AM    Creatinine 0.93 10/26/2020 08:00 AM    BUN 17 10/26/2020 08:00 AM    Sodium 140 10/26/2020 08:00 AM    Potassium 4.2 10/26/2020 08:00 AM    Chloride 103 10/26/2020 08:00 AM    CO2 24 10/26/2020 08:00 AM        Review of Systems   Constitutional: Negative for fever. Respiratory: Negative for shortness of breath. Cardiovascular: Negative for chest pain. Neurological: Negative for headaches. Physical Exam  Constitutional:       General: She is not in acute distress. Appearance: Normal appearance. She is not ill-appearing, toxic-appearing or diaphoretic. HENT:      Head: Normocephalic and atraumatic. Eyes:      General:         Right eye: No discharge.          Left eye: No discharge. Conjunctiva/sclera: Conjunctivae normal.   Pulmonary:      Effort: No respiratory distress. Neurological:      Mental Status: She is alert and oriented to person, place, and time. Mental status is at baseline. Gait: Gait normal.   Psychiatric:         Mood and Affect: Mood normal.         Behavior: Behavior normal.         ASSESSMENT and PLAN    ICD-10-CM ICD-9-CM    1. Essential hypertension       Start taking Bystolic 2.5 mg every day consistently bring cuff to follow-up to check for accuracy reviewed labs at goal     I10 401.9    2. Systemic lupus erythematosus, unspecified SLE type, unspecified organ involvement status ([facility] [address]         Overall stable on CellCept and Cosentyx M32.9 710.0    3. Rheumatoid arthritis involving multiple sites with positive rheumatoid factor [facility])       Last saw Dr. Shamika Marin her rheumatologist last spring we will schedule follow-up continues on CellCept and Cosentyx M05.79 714.0    4. Neuropathy       Improved with gabapentin G62.9 355.9    5. Hot flashes         Discussed can increase gabapentin to twice daily dosing and see if this helps    She will also touch base with her rheumatologist to see if this is or could be related to her underlying autoimmune disease    Discussed Paxil and Effexor as other options to help with hot flashes R23.2 782.62            Scribed by Lamar Goltz of [address] [place] [address] [address], as dictated by Dr. Rene Garcia. Current diagnosis and concerns discussed with pt at length. Pt understands risks and benefits or current treatment plan and medications, and accepts the treatment and medication with any possible risks. Pt asks appropriate questions, which were answered. Pt was instructed to call with any concerns or problems. I have reviewed the note documented by the scribe. The services provided are my own.   The documentation is accurate     Remigio Fowler, who was evaluated through a synchronous (real-time) audio-video encounter, and/or her healthcare decision maker, is aware that it is a billable service, with coverage as determined by her insurance carrier. She provided verbal consent to proceed: Yes, and patient identification was verified. It was conducted pursuant to the emergency declaration under the [address] [place] [address], [address] [place] [address] authority and the [employer] and [employer] General Act. A caregiver was present when appropriate. Ability to conduct physical exam was limited. I was at home. The patient was at home.

## 2020-12-04 ENCOUNTER — VIRTUAL VISIT (OUTPATIENT)
Dept: INTERNAL MEDICINE CLINIC | Age: 60
End: 2020-12-04
Payer: COMMERCIAL

## 2020-12-04 DIAGNOSIS — M32.9 SYSTEMIC LUPUS ERYTHEMATOSUS, UNSPECIFIED SLE TYPE, UNSPECIFIED ORGAN INVOLVEMENT STATUS (HCC): ICD-10-CM

## 2020-12-04 DIAGNOSIS — G62.9 NEUROPATHY: ICD-10-CM

## 2020-12-04 DIAGNOSIS — R23.2 HOT FLASHES: ICD-10-CM

## 2020-12-04 DIAGNOSIS — M05.79 RHEUMATOID ARTHRITIS INVOLVING MULTIPLE SITES WITH POSITIVE RHEUMATOID FACTOR (HCC): ICD-10-CM

## 2020-12-04 DIAGNOSIS — I10 ESSENTIAL HYPERTENSION: Primary | ICD-10-CM

## 2020-12-04 PROCEDURE — 99214 OFFICE O/P EST MOD 30 MIN: CPT | Performed by: INTERNAL MEDICINE

## 2021-01-05 DIAGNOSIS — Z78.0 POSTMENOPAUSAL: ICD-10-CM

## 2021-01-05 RX ORDER — ESTRADIOL 0.04 MG/D
1 PATCH TRANSDERMAL
Qty: 4 PATCH | Refills: 11 | Status: SHIPPED | OUTPATIENT
Start: 2021-01-09 | End: 2021-09-10 | Stop reason: ALTCHOICE

## 2021-06-14 RX ORDER — NEBIVOLOL HYDROCHLORIDE 5 MG/1
TABLET ORAL
Qty: 30 TABLET | Refills: 2 | Status: SHIPPED | OUTPATIENT
Start: 2021-06-14 | End: 2021-10-03

## 2021-06-14 NOTE — TELEPHONE ENCOUNTER
Medication Refill     Caller (if not patient): Heidi Mulligan from [facility] infusion pharmacy       Relationship of caller (if not patient):pharmacist       Best contact number(s):[phone]   FAX #[phone]       Name of medication and dosage if known: Bystolic 5mg , 1 x per day       Is patient out of this medication (yes/no): yes       [facility] listed in chart? (yes/no):   Pharmacy phone number:       Details to clarify the request:       Message from [facility]

## 2021-06-14 NOTE — TELEPHONE ENCOUNTER
Future Appointments:  No future appointments.      Last Appointment With Me:  Visit date not found     Requested Prescriptions     Pending Prescriptions Disp Refills    Bystolic 5 mg tablet [Pharmacy Med Name: BYSTOLIC 5MG TABS] 30 Tablet 2     Sig: TAKE ONE TABLET BY MOUTH EVERY DAY

## 2021-09-10 ENCOUNTER — VIRTUAL VISIT (OUTPATIENT)
Dept: INTERNAL MEDICINE CLINIC | Age: 61
End: 2021-09-10
Payer: COMMERCIAL

## 2021-09-10 DIAGNOSIS — R23.2 HOT FLASHES: ICD-10-CM

## 2021-09-10 DIAGNOSIS — R20.2 TINGLING OF BOTH FEET: ICD-10-CM

## 2021-09-10 DIAGNOSIS — M05.79 RHEUMATOID ARTHRITIS INVOLVING MULTIPLE SITES WITH POSITIVE RHEUMATOID FACTOR (HCC): ICD-10-CM

## 2021-09-10 DIAGNOSIS — E55.9 VITAMIN D DEFICIENCY: ICD-10-CM

## 2021-09-10 DIAGNOSIS — Z12.31 ENCOUNTER FOR SCREENING MAMMOGRAM FOR MALIGNANT NEOPLASM OF BREAST: ICD-10-CM

## 2021-09-10 DIAGNOSIS — N89.8 VAGINAL DRYNESS: ICD-10-CM

## 2021-09-10 DIAGNOSIS — Z78.0 POSTMENOPAUSAL: Primary | ICD-10-CM

## 2021-09-10 PROCEDURE — 99214 OFFICE O/P EST MOD 30 MIN: CPT | Performed by: INTERNAL MEDICINE

## 2021-09-10 RX ORDER — VENLAFAXINE HYDROCHLORIDE 37.5 MG/1
37.5 CAPSULE, EXTENDED RELEASE ORAL DAILY
Qty: 90 CAPSULE | Refills: 1 | Status: SHIPPED | OUTPATIENT
Start: 2021-09-10 | End: 2022-03-08

## 2021-09-10 NOTE — PROGRESS NOTES
Ms. 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 Currently on Cellcept and cosentyx   Dr Linda Mckeon is following patient - I need records    Taking gabapentin for leg cramps and spasms at night  No depression      Postmenopausal symptoms this was well controlled with the estradiol patch. Patient stopped estradiol patch due to risk of staying on it long herm  And having hot flashes \" Please help me\"  Also experiencing vaginal discomfort with intercourse        In the interval had colonoscopy reviewed   Had covid vaccines and due for booster  Due to flu shot as well  Will discuss shingles vaccine at next visit      This is an established visit conducted via telemedicine with video. The patient has been instructed that this meets HIPAA criteria and acknowledges and agrees to this method of visitation. Pursuant to the emergency declaration under the [facility], 1135 waiver authority and the [employer] Act, this Virtual Visit was conducted, with patient's consent, to reduce the patient's risk of exposure to COVID-19 and provide continuity of care for an established patient. Services were provided through a video synchronous discussion virtually to substitute for in-person clinic visit. Review of systems:  10 systems reviewed and negative other than HPI       Past Medical History:   Diagnosis Date    C. difficile diarrhea     Drug-induced lupus erythematosus     ramicade induced    HTN (hypertension)     Psoriasis     RA (rheumatoid arthritis) ([facility] [address]     Rosacea         Past Surgical History:   Procedure Laterality Date    HX PARTIAL HYSTERECTOMY  2013    had fibroids       Allergies   Allergen Reactions    Diphenhydramine Hcl Palpitations    Sulfa (Sulfonamide Antibiotics) Hives and Rash       Current Outpatient Medications on File Prior to Visit   Medication Sig Dispense Refill    Bystolic 5 mg tablet TAKE ONE TABLET BY MOUTH EVERY DAY 30 Tablet 2    estradioL (CLIMARA) 0.0375 mg/24 hr 1 Patch by TransDERmal route Every Saturday. 4 Patch 11    mycophenolate (CELLCEPT) 500 mg tablet Take 500 mg by mouth two (2) times a day.  secukinumab (COSENTYX PEN) 150 mg/mL pnij by SubCUTAneous route.  gabapentin (NEURONTIN) 300 mg capsule Take 300 mg by mouth three (3) times daily.  pantoprazole (PROTONIX) 40 mg tablet Take 40 mg by mouth daily.  metroNIDAZOLE (METROCREAM) 0.75 % topical cream Apply  to affected area two (2) times a day. Use a thin layer to affected areas after washing 45 g 5     No current facility-administered medications on file prior to visit. family history includes Alcohol abuse in her mother; Cancer in her father. Social History     Socioeconomic History    Marital status:      Spouse name: Not on file    Number of children: Not on file    Years of education: Not on file    Highest education level: Not on file   Occupational History    Not on file   Tobacco Use    Smoking status: Never Smoker    Smokeless tobacco: Never Used   Substance and Sexual Activity    Alcohol use:  Yes    Drug use: No    Sexual activity: Yes     Partners: Male   Other Topics Concern    Not on file   Social History Narrative    Not on file     Social Determinants of Health     Financial Resource Strain:     Difficulty of Paying Living Expenses:    Food Insecurity:     Worried About [facility] in the Last Year:    [address] in the Last Year:    Transportation Needs:     Lack of Transportation (Medical):  Lack of Transportation (Non-Medical):    Physical Activity:     Days of Exercise per Week:     Minutes of Exercise per Session:    Stress:     Feeling of Stress :    Social Connections:     Frequency of Communication with Friends and Family:     Frequency of Social Gatherings with Friends and Family:     Attends Alevism Services:     Active Member of Clubs or Organizations:     Attends Club or Organization Meetings:     Marital Status:    Intimate Partner Violence:     Fear of Current or Ex-Partner:     Emotionally Abused:     Physically Abused:     Sexually Abused: There were no vitals taken for this visit. General:  Well appearing female no acute distress  HEENT:   normal conjunctiva. External ear normal  Neck:  Supple.    Respiratory: no respiratory distress,  Speaking in full sentences    Extremities  no edema  Musculoskeletal:  Normal gait  Skin:  No visible  rash  Neuro:  A and OX4, fluent speech, cranial nerves grossly intact    Psych:  Normal affect      Lab Results   Component Value Date/Time    WBC 3.0 (L) 10/26/2020 08:00 AM    HGB 13.0 10/26/2020 08:00 AM    HCT 40.1 10/26/2020 08:00 AM    PLATELET 135 11/47/2310 08:00 AM    MCV 88 10/26/2020 08:00 AM     Lab Results   Component Value Date/Time    Sodium 140 10/26/2020 08:00 AM    Potassium 4.2 10/26/2020 08:00 AM    Chloride 103 10/26/2020 08:00 AM    CO2 24 10/26/2020 08:00 AM    Glucose 88 10/26/2020 08:00 AM    BUN 17 10/26/2020 08:00 AM    Creatinine 0.93 10/26/2020 08:00 AM    BUN/Creatinine ratio 18 10/26/2020 08:00 AM    GFR est AA 77 10/26/2020 08:00 AM    GFR est non-AA 67 10/26/2020 08:00 AM    Calcium 9.4 10/26/2020 08:00 AM     Lab Results   Component Value Date/Time    Cholesterol, total 182 10/26/2020 08:00 AM    HDL Cholesterol 87 10/26/2020 08:00 AM    LDL, calculated 81 10/26/2020 08:00 AM    LDL, calculated 95 10/08/2018 10:47 AM    VLDL, calculated 14 10/26/2020 08:00 AM    VLDL, calculated 16 10/08/2018 10:47 AM    Triglyceride 76 10/26/2020 08:00 AM     Lab Results   Component Value Date/Time    TSH 3.070 10/26/2020 08:00 AM     Lab Results   Component Value Date/Time    Hemoglobin A1c 5.3 10/26/2020 08:00 AM     No results found for: Jose A Knife, XQVID3, XQVID, VD3RIA                Assessment and Plan:   57-year-old woman with history of rheumatoid arthritis, Remicade induced lupus, psoriasis presenting to discuss hot flashes and feet pain      Autoimmune disease. Rheumatoid arthritis, Remicade induced lupus and psoriasis  Followed by rheumatologist at [facility] I requested records  Patient is on CellCept and Cesentyx she had labs done recently will request records       3. Postmenopausal  After menopause she had vaginal dryness causing pain during sexual intercourse and hot flashes. Previously on estradiol patch stopped due to risks. Today I am starting effexor low dose as well as vaginal estrogen cream to help with vaginal dryness      4. Screening mammogram, encounter for  - [facility] MAMMO BI SCREENING INCL CAD; Future      5. Feet pain: possibly neuropathy? Plantar fascitis? Patient will have EMG  Will also check B12, TSH        This is an established visit conducted via real time video and audio telemedicine. The patient has been instructed that this meets HIPAA criteria and acknowledges and agrees to this method of visitation.     Bob Lee MD  09/10/21  11:49 AM    Bob Lee MD

## 2021-10-03 RX ORDER — NEBIVOLOL HYDROCHLORIDE 5 MG/1
TABLET ORAL
Qty: 30 TABLET | Refills: 2 | Status: SHIPPED | OUTPATIENT
Start: 2021-10-03 | End: 2022-05-09 | Stop reason: SDUPTHER

## 2022-03-07 DIAGNOSIS — N89.8 VAGINAL DRYNESS: ICD-10-CM

## 2022-03-07 DIAGNOSIS — Z78.0 POSTMENOPAUSAL: ICD-10-CM

## 2022-03-07 DIAGNOSIS — R23.2 HOT FLASHES: ICD-10-CM

## 2022-03-07 NOTE — TELEPHONE ENCOUNTER
Future Appointments:  No future appointments.      Last Appointment With Me:  9/10/2021     Requested Prescriptions     Pending Prescriptions Disp Refills    venlafaxine-SR (EFFEXOR-XR) 37.5 mg capsule [Pharmacy Med Name: VENLAFAXINE HCL ER 37.5 MG CAP] 90 Capsule 1     Sig: TAKE 1 CAPSULE BY MOUTH EVERY DAY

## 2022-03-08 DIAGNOSIS — N89.8 VAGINAL DRYNESS: ICD-10-CM

## 2022-03-08 DIAGNOSIS — Z78.0 POSTMENOPAUSAL: ICD-10-CM

## 2022-03-08 DIAGNOSIS — R23.2 HOT FLASHES: ICD-10-CM

## 2022-03-08 RX ORDER — VENLAFAXINE HYDROCHLORIDE 37.5 MG/1
CAPSULE, EXTENDED RELEASE ORAL
Qty: 90 CAPSULE | Refills: 1 | Status: SHIPPED | OUTPATIENT
Start: 2022-03-08 | End: 2022-03-08 | Stop reason: SDUPTHER

## 2022-03-08 RX ORDER — VENLAFAXINE HYDROCHLORIDE 37.5 MG/1
37.5 CAPSULE, EXTENDED RELEASE ORAL DAILY
Qty: 90 CAPSULE | Refills: 1 | Status: SHIPPED | OUTPATIENT
Start: 2022-03-08 | End: 2022-09-06

## 2022-03-08 NOTE — TELEPHONE ENCOUNTER
Future Appointments:  No future appointments. Last Appointment With Me:  9/10/2021     Requested Prescriptions     Pending Prescriptions Disp Refills    venlafaxine-SR (EFFEXOR-XR) 37.5 mg capsule 90 Capsule 1     Sig: Take 1 Capsule by mouth daily.

## 2022-03-18 PROBLEM — M32.9 SYSTEMIC LUPUS ERYTHEMATOSUS (HCC): Status: ACTIVE | Noted: 2018-10-08

## 2022-05-09 RX ORDER — NEBIVOLOL 5 MG/1
5 TABLET ORAL DAILY
Qty: 30 TABLET | Refills: 2 | Status: SHIPPED | OUTPATIENT
Start: 2022-05-09 | End: 2022-08-01

## 2022-05-09 NOTE — TELEPHONE ENCOUNTER
Future Appointments:  No future appointments. Last Appointment With Me:  Visit date not found     Requested Prescriptions     Pending Prescriptions Disp Refills    nebivoloL (Bystolic) 5 mg tablet 30 Tablet 2     Sig: Take 1 Tablet by mouth daily.

## 2022-06-14 ENCOUNTER — OFFICE VISIT (OUTPATIENT)
Dept: INTERNAL MEDICINE CLINIC | Age: 62
End: 2022-06-14

## 2022-06-14 VITALS
HEIGHT: 63 IN | OXYGEN SATURATION: 100 % | SYSTOLIC BLOOD PRESSURE: 109 MMHG | RESPIRATION RATE: 16 BRPM | TEMPERATURE: 96.8 F | DIASTOLIC BLOOD PRESSURE: 69 MMHG | BODY MASS INDEX: 22.86 KG/M2 | WEIGHT: 129 LBS | HEART RATE: 62 BPM

## 2022-06-14 DIAGNOSIS — E78.2 MIXED HYPERLIPIDEMIA: ICD-10-CM

## 2022-06-14 DIAGNOSIS — Z78.0 POSTMENOPAUSAL: ICD-10-CM

## 2022-06-14 DIAGNOSIS — G62.9 NEUROPATHY: ICD-10-CM

## 2022-06-14 DIAGNOSIS — Z12.31 ENCOUNTER FOR SCREENING MAMMOGRAM FOR MALIGNANT NEOPLASM OF BREAST: ICD-10-CM

## 2022-06-14 DIAGNOSIS — Z13.220 SCREENING CHOLESTEROL LEVEL: ICD-10-CM

## 2022-06-14 DIAGNOSIS — M32.9 SYSTEMIC LUPUS ERYTHEMATOSUS, UNSPECIFIED SLE TYPE, UNSPECIFIED ORGAN INVOLVEMENT STATUS (HCC): ICD-10-CM

## 2022-06-14 DIAGNOSIS — K21.9 GASTROESOPHAGEAL REFLUX DISEASE WITHOUT ESOPHAGITIS: ICD-10-CM

## 2022-06-14 DIAGNOSIS — E55.9 VITAMIN D DEFICIENCY: ICD-10-CM

## 2022-06-14 DIAGNOSIS — N89.8 VAGINAL DRYNESS: Primary | ICD-10-CM

## 2022-06-14 DIAGNOSIS — R23.2 HOT FLASHES: ICD-10-CM

## 2022-06-14 PROCEDURE — 99396 PREV VISIT EST AGE 40-64: CPT | Performed by: INTERNAL MEDICINE

## 2022-06-14 NOTE — PROGRESS NOTES
Ms. Sanju Hill is presenting to follow up     CC:  Physical       HPI:    59 yo woman with a hx of autoimmune disease (SLE/Sjogren's (possibly drug induced from Remicade in past). Her inflammatory joint pain is suggestive of seronegative arthritis c/w PsA given h/o psoriasis. . Currently on Cellcept and cosentyx     Reviewed note from Dr Owen Duncan November 2021 [facility]      Has burning pain in feet   mostly heels and bottom of feet  At night has pain which is burning in quality  Gabapentin is helping and patient wants to hold off with EMG  No diabetes and no thyroid disease          Postmenopausal symptoms this was well controlled with the estradiol patch.  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 difficile diarrhea     Drug-induced lupus erythematosus     ramicade induced    HTN (hypertension)     Psoriasis     RA [facility]rhe[facility]oid [facility]) ([facility] [address]     Rosacea         Past Surgical History:   Procedure Laterality Date    HX PARTIAL HYSTERECTOMY  2013    had fibroids       Allergies   Allergen Reactions    Diphenhydramine Hcl Palpitations    Sulfa (Sulfonamide Antibiotics) Hives and Rash       Current Outpatient Medications on File Prior to Visit   Medication Sig Dispense Refill    nebivoloL (Bystolic) 5 mg tablet Take 1 Tablet by mouth daily. 30 Tablet 2    venlafaxine-SR (EFFEXOR-XR) 37.5 mg capsule Take 1 Capsule by mouth daily. 90 Capsule 1    mycophenolate (CELLCEPT) 500 mg tablet Take 500 mg by mouth two (2) times a day.  secukinumab (COSENTYX PEN) 150 mg/mL pnij by SubCUTAneous route.  gabapentin (NEURONTIN) 300 mg capsule Take 300 mg by mouth three (3) times daily.  pantoprazole (PROTONIX) 40 mg tablet Take 40 mg by mouth daily.  metroNIDAZOLE (METROCREAM) 0.75 % topical cream Apply  to affected area two (2) times a day. Use a thin layer to affected areas after washing 45 g 5     No current facility-administered medications on file prior to visit. family history includes Alcohol abuse in her mother; Cancer in her father; Uterine Cancer in her mother. Social History     Socioeconomic History    Marital status:      Spouse name: Not on file    Number of children: Not on file    Years of education: Not on file    Highest education level: Not on file   Occupational History    Not on file   Tobacco Use    Smoking status: Never Smoker    Smokeless tobacco: Never Used   Vaping Use    Vaping Use: Never used   Substance and Sexual Activity    Alcohol use: Yes    Drug use: No    Sexual activity: Yes     Partners: Male   Other Topics Concern    Not on file   Social History Narrative    Not on file     Social Determinants of Health     Financial Resource Strain:     Difficulty of Paying Living Expenses: Not on file   Food Insecurity:     Worried About Running Out of Food in the Last Year: Not on file    Giancarlo of Food in the Last Year: Not on file   Transportation Needs:     Lack of Transportation (Medical): Not on file    Lack of Transportation (Non-Medical):  Not on file   Physical Activity:     Days of Exercise per Week: Not on file    Minutes of Exercise per Session: Not on file   Stress:     Feeling of Stress : Not on file   Social Connections:     Frequency of Communication with Friends and Family: Not on file    Frequency of Social Gatherings with Friends and Family: Not on file    Attends Lutheran Services: Not on file    Active Member of Clubs or Organizations: Not on file    Attends Club or Organization Meetings: Not on file    Marital Status: Not on file   Intimate Partner Violence:     Fear of Current or Ex-Partner: Not on file    Emotionally Abused: Not on file    Physically Abused: Not on file    Sexually Abused: Not on file   Housing Stability:     Unable to Pay for Housing in the Last Year: Not on file    Number of [place] in the Last Year: Not on file    Unstable Housing in the Last Year: Not on file       Visit Vitals  /69   Pulse 62   Temp 96.8 °F (36 °C) (Temporal)   Resp 16   Ht 5' 3\" (1.6 m)   Wt 129 lb (58.5 kg)   SpO2 100%   BMI 22.85 kg/m²     General:  Well appearing female no acute distress  HEENT:   PERRL,normal conjunctiva. External ear and canals normal, TMs normal.    Neck:  Supple. Thyroid normal size, nontender, without nodules. No carotid bruit. No masses or lymphadenopathy  Respiratory: no respiratory distress,  no wheezing, no rhonchi, no rales. No chest wall tenderness. Cardiovascular:  RRR, normal S1S2, no murmur. Gastrointestinal: normal bowel sounds, soft, nontender, without masses. No hepatosplenomegaly. Extremities +2 pulses, no edema, normal sensation   Musculoskeletal:  Normal gait. Normal digits and nails. Normal strength and tone, no atrophy, and no abnormal movement. Skin:  No rash, no lesions, no ulcers. Skin warm, normal turgor, without induration or nodules. Neuro:  A and OX4, fluent speech, cranial nerves normal 2-12.     Psych:  Normal affect      Lab Results   Component Value Date/Time    WBC 3.0 (L) 10/26/2020 08:00 AM    HGB 13.0 10/26/2020 08:00 AM    HCT 40.1 10/26/2020 08:00 AM    PLATELET 823 78/34/0088 08:00 AM    MCV 88 10/26/2020 08:00 AM     Lab Results   Component Value Date/Time    Sodium 140 10/26/2020 08:00 AM    Potassium 4.2 10/26/2020 08:00 AM    Chloride 103 10/26/2020 08:00 AM    CO2 24 10/26/2020 08:00 AM    Glucose 88 10/26/2020 08:00 AM    BUN 17 10/26/2020 08:00 AM    Creatinine 0.93 10/26/2020 08:00 AM    BUN/Creatinine ratio 18 10/26/2020 08:00 AM    GFR est AA 77 10/26/2020 08:00 AM    GFR est non-AA 67 10/26/2020 08:00 AM    Calcium 9.4 10/26/2020 08:00 AM     Lab Results   Component Value Date/Time    Cholesterol, total 182 10/26/2020 08:00 AM    HDL Cholesterol 87 10/26/2020 08:00 AM    LDL, calculated 81 10/26/2020 08:00 AM    LDL, calculated 95 10/08/2018 10:47 AM    VLDL, calculated 14 10/26/2020 08:00 AM    VLDL, calculated 16 10/08/2018 10:47 AM    Triglyceride 76 10/26/2020 08:00 AM     Lab Results   Component Value Date/Time    TSH 3.070 10/26/2020 08:00 AM     Lab Results   Component Value Date/Time    Hemoglobin A1c 5.3 10/26/2020 08:00 AM     No results found for: VITD3, XQVID2, XQVID3, XQVID, VD3RIA                Assessment and Plan:     1. Vaginal dryness  Causing pain during intercourse refractory to vaginal premarin. Therefore will resume premarin oral  Discussed risks including increase risk of CAD, stroke, blood clots and uterine cancer  Reminded to start after she does her mammogram  - conjugated estrogens (PREMARIN) 0.3 mg tablet; Take 1 Tablet by mouth daily. Dispense: 30 Tablet; Refill: 2    2. Hot flashes  The effexor is helping   - conjugated estrogens (PREMARIN) 0.3 mg tablet; Take 1 Tablet by mouth daily. Dispense: 30 Tablet; Refill: 2    3. Postmenopausal  Continue effexor  - conjugated estrogens (PREMARIN) 0.3 mg tablet; Take 1 Tablet by mouth daily. Dispense: 30 Tablet; Refill: 2    4. Systemic lupus erythematosus, unspecified SLE type, unspecified organ involvement status (HCC)/ sjogrens  Followed at [facility] on cellcept and cosentyx  Reviewed note from Dr Carol Worley November 2021    5. Mixed hyperlipidemia  - LIPID PANEL; Future    6.  Gastroesophageal reflux disease without esophagitis  Stable on protonix    7. Vitamin D deficiency  - VITAMIN D, 25 HYDROXY; Future    8. Neuropathy  Doing better on gabapentin and wants to defer EMG  - TSH 3RD GENERATION; Future  - T4, FREE; Future  - VITAMIN B12; Future  - METABOLIC PANEL, COMPREHENSIVE; Future  - CBC WITH AUTOMATED DIFF; Future    9. Screening cholesterol level  - LIPID PANEL; Future    10. Encounter for screening mammogram for malignant neoplasm of breast  - [facility] MAMMO BI SCREENING INCL CAD; Future      Follow-up and Dispositions    · Return in about 6 months (around 12/14/2022).           Sergio Haines MD

## 2022-06-14 NOTE — PROGRESS NOTES
1. \"Have you been to the ER, urgent care clinic since your last visit? Hospitalized since your last visit? \" No    2. \"Have you seen or consulted any other health care providers outside of the [address] [place] [address] since your last visit? \" Yes Reason for visit: [address]     3. For patients aged 39-70: Has the patient had a colonoscopy / FIT/ Cologuard? Yes - no Care Gap present      If the patient is female:    4. For patients aged 41-77: Has the patient had a mammogram within the past 2 years? No      5. For patients aged 21-65: Has the patient had a pap smear?  Yes - no Care Gap present

## 2022-06-30 ENCOUNTER — CLINICAL SUPPORT (OUTPATIENT)
Dept: INTERNAL MEDICINE CLINIC | Age: 62
End: 2022-06-30
Payer: COMMERCIAL

## 2022-06-30 ENCOUNTER — LAB ONLY (OUTPATIENT)
Dept: INTERNAL MEDICINE CLINIC | Age: 62
End: 2022-06-30

## 2022-06-30 VITALS
BODY MASS INDEX: 23.39 KG/M2 | RESPIRATION RATE: 16 BRPM | HEART RATE: 80 BPM | SYSTOLIC BLOOD PRESSURE: 138 MMHG | DIASTOLIC BLOOD PRESSURE: 80 MMHG | WEIGHT: 132 LBS | TEMPERATURE: 97.9 F | OXYGEN SATURATION: 96 % | HEIGHT: 63 IN

## 2022-06-30 DIAGNOSIS — R30.0 DYSURIA: ICD-10-CM

## 2022-06-30 DIAGNOSIS — G62.9 NEUROPATHY: ICD-10-CM

## 2022-06-30 DIAGNOSIS — E55.9 VITAMIN D DEFICIENCY: ICD-10-CM

## 2022-06-30 DIAGNOSIS — N30.01 ACUTE CYSTITIS WITH HEMATURIA: Primary | ICD-10-CM

## 2022-06-30 DIAGNOSIS — E78.2 MIXED HYPERLIPIDEMIA: ICD-10-CM

## 2022-06-30 DIAGNOSIS — R82.81 PYURIA: ICD-10-CM

## 2022-06-30 DIAGNOSIS — Z13.220 SCREENING CHOLESTEROL LEVEL: ICD-10-CM

## 2022-06-30 DIAGNOSIS — R82.81 PYURIA: Primary | ICD-10-CM

## 2022-06-30 LAB
25(OH)D3 SERPL-MCNC: 23.6 NG/ML (ref 30–100)
ALBUMIN SERPL-MCNC: 3.6 G/DL (ref 3.5–5)
ALBUMIN/GLOB SERPL: 1.2 {RATIO} (ref 1.1–2.2)
ALP SERPL-CCNC: 58 U/L (ref 45–117)
ALT SERPL-CCNC: 19 U/L (ref 12–78)
ANION GAP SERPL CALC-SCNC: 8 MMOL/L (ref 5–15)
AST SERPL-CCNC: 11 U/L (ref 15–37)
BASOPHILS # BLD: 0 K/UL (ref 0–0.1)
BASOPHILS NFR BLD: 1 % (ref 0–1)
BILIRUB SERPL-MCNC: 0.3 MG/DL (ref 0.2–1)
BILIRUB UR QL STRIP: NEGATIVE
BUN SERPL-MCNC: 15 MG/DL (ref 6–20)
BUN/CREAT SERPL: 17 (ref 12–20)
CALCIUM SERPL-MCNC: 9.1 MG/DL (ref 8.5–10.1)
CHLORIDE SERPL-SCNC: 104 MMOL/L (ref 97–108)
CHOLEST SERPL-MCNC: 177 MG/DL
CO2 SERPL-SCNC: 26 MMOL/L (ref 21–32)
CREAT SERPL-MCNC: 0.89 MG/DL (ref 0.55–1.02)
DIFFERENTIAL METHOD BLD: NORMAL
EOSINOPHIL # BLD: 0.1 K/UL (ref 0–0.4)
EOSINOPHIL NFR BLD: 1 % (ref 0–7)
ERYTHROCYTE [DISTWIDTH] IN BLOOD BY AUTOMATED COUNT: 12.4 % (ref 11.5–14.5)
GLOBULIN SER CALC-MCNC: 3.1 G/DL (ref 2–4)
GLUCOSE SERPL-MCNC: 115 MG/DL (ref 65–100)
GLUCOSE UR-MCNC: NEGATIVE MG/DL
HCT VFR BLD AUTO: 37.8 % (ref 35–47)
HDLC SERPL-MCNC: 78 MG/DL
HDLC SERPL: 2.3 {RATIO} (ref 0–5)
HGB BLD-MCNC: 11.8 G/DL (ref 11.5–16)
IMM GRANULOCYTES # BLD AUTO: 0 K/UL (ref 0–0.04)
IMM GRANULOCYTES NFR BLD AUTO: 0 % (ref 0–0.5)
KETONES P FAST UR STRIP-MCNC: NEGATIVE MG/DL
LDLC SERPL CALC-MCNC: 61.2 MG/DL (ref 0–100)
LYMPHOCYTES # BLD: 1 K/UL (ref 0.8–3.5)
LYMPHOCYTES NFR BLD: 17 % (ref 12–49)
MCH RBC QN AUTO: 28.2 PG (ref 26–34)
MCHC RBC AUTO-ENTMCNC: 31.2 G/DL (ref 30–36.5)
MCV RBC AUTO: 90.4 FL (ref 80–99)
MONOCYTES # BLD: 0.4 K/UL (ref 0–1)
MONOCYTES NFR BLD: 7 % (ref 5–13)
NEUTS SEG # BLD: 4.5 K/UL (ref 1.8–8)
NEUTS SEG NFR BLD: 74 % (ref 32–75)
NRBC # BLD: 0 K/UL (ref 0–0.01)
NRBC BLD-RTO: 0 PER 100 WBC
PH UR STRIP: 6 [PH] (ref 4.6–8)
PLATELET # BLD AUTO: 384 K/UL (ref 150–400)
PMV BLD AUTO: 9.9 FL (ref 8.9–12.9)
POTASSIUM SERPL-SCNC: 4.2 MMOL/L (ref 3.5–5.1)
PROT SERPL-MCNC: 6.7 G/DL (ref 6.4–8.2)
PROT UR QL STRIP: NORMAL
RBC # BLD AUTO: 4.18 M/UL (ref 3.8–5.2)
SODIUM SERPL-SCNC: 138 MMOL/L (ref 136–145)
SP GR UR STRIP: 1.02 (ref 1–1.03)
T4 FREE SERPL-MCNC: 0.9 NG/DL (ref 0.8–1.5)
TRIGL SERPL-MCNC: 189 MG/DL (ref ?–150)
TSH SERPL DL<=0.05 MIU/L-ACNC: 1.84 UIU/ML (ref 0.36–3.74)
UA UROBILINOGEN AMB POC: NORMAL (ref 0.2–1)
URINALYSIS CLARITY POC: NORMAL
URINALYSIS COLOR POC: NORMAL
URINE BLOOD POC: NORMAL
URINE LEUKOCYTES POC: NORMAL
URINE NITRITES POC: POSITIVE
VIT B12 SERPL-MCNC: 190 PG/ML (ref 193–986)
VLDLC SERPL CALC-MCNC: 37.8 MG/DL
WBC # BLD AUTO: 6 K/UL (ref 3.6–11)

## 2022-06-30 PROCEDURE — 81003 URINALYSIS AUTO W/O SCOPE: CPT | Performed by: INTERNAL MEDICINE

## 2022-06-30 PROCEDURE — 99211 OFF/OP EST MAY X REQ PHY/QHP: CPT | Performed by: INTERNAL MEDICINE

## 2022-06-30 RX ORDER — NITROFURANTOIN 25; 75 MG/1; MG/1
100 CAPSULE ORAL 2 TIMES DAILY
Qty: 14 CAPSULE | Refills: 0 | Status: SHIPPED | OUTPATIENT
Start: 2022-06-30

## 2022-06-30 NOTE — PROGRESS NOTES
Identified pt with two pt identifiers(name and ). Reviewed record in preparation for visit and have obtained necessary documentation.   Chief Complaint   Patient presents with    Bladder Infection        Vitals:    22 1629 22 1640   BP: (!) 155/79 138/80   Pulse: 80    Resp: 16    Temp: 97.9 °F (36.6 °C)    TempSrc: Temporal    SpO2: 96%    Weight: 132 lb (59.9 kg)    Height: 5' 3\" (1.6 m)          [place], [address]  [address]  W: [phone]  F: [phone]

## 2022-07-01 NOTE — PROGRESS NOTES
Urine is concerning for UTI and antibiotic was sent yesterday  Normal blood count  Normal kidney and liver function   Thyroid function is normal  B12 is low at 190 - recommend 1000 mcg injection weekly for 4 weeks then repeat level - please arrange for patient to have B12 injections

## 2022-07-02 LAB
BACTERIA SPEC CULT: ABNORMAL
CC UR VC: ABNORMAL
SERVICE CMNT-IMP: ABNORMAL

## 2022-07-06 NOTE — PROGRESS NOTES
I left a message for the patient to call me back at the office.  Signed By: Lonnie Stevens LPN    July 6, 4272

## 2022-07-07 ENCOUNTER — PATIENT MESSAGE (OUTPATIENT)
Dept: INTERNAL MEDICINE CLINIC | Age: 62
End: 2022-07-07

## 2022-07-07 RX ORDER — CYANOCOBALAMIN 1000 UG/ML
1000 INJECTION, SOLUTION INTRAMUSCULAR; SUBCUTANEOUS ONCE
COMMUNITY
End: 2022-07-07 | Stop reason: SDUPTHER

## 2022-07-07 RX ORDER — SYRINGE,SAFETY WITH NEEDLE,3ML 23GX1"
SYRINGE, EMPTY DISPOSABLE MISCELLANEOUS
COMMUNITY
End: 2022-07-07 | Stop reason: SDUPTHER

## 2022-07-07 RX ORDER — NEEDLES, SAFETY 22GX1 1/2"
NEEDLE, DISPOSABLE MISCELLANEOUS
COMMUNITY
End: 2022-07-07 | Stop reason: SDUPTHER

## 2022-07-07 NOTE — TELEPHONE ENCOUNTER
I sent the patient a message via [employer] and a couple of messages on her voice mail re:lab results.    Signed By: Loan Borja LPN     July 7, 6648

## 2022-07-07 NOTE — PROGRESS NOTES
The patient was contacted via [employer] re: results. Signed By: Alexsandra Brandon LPN    July 7, 0108

## 2022-07-07 NOTE — PROGRESS NOTES
I left a message for the patient to call me back at the office.  Signed By: Zola Schlatter, LPN    July 7, 8369

## 2022-07-07 NOTE — TELEPHONE ENCOUNTER
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 Can you please send B-12 and supplies into her pharmacy.  Thank you,   Signed By: Inder Ortiz LPN     July 7, 5446

## 2022-07-11 RX ORDER — SYRINGE,SAFETY WITH NEEDLE,3ML 23GX1"
1 SYRINGE, EMPTY DISPOSABLE MISCELLANEOUS
Qty: 12 PEN NEEDLE | Refills: 1 | Status: SHIPPED | OUTPATIENT
Start: 2022-07-11

## 2022-07-11 RX ORDER — CYANOCOBALAMIN 1000 UG/ML
1000 INJECTION, SOLUTION INTRAMUSCULAR; SUBCUTANEOUS ONCE
Qty: 4 ML | Refills: 2 | Status: SHIPPED | OUTPATIENT
Start: 2022-07-11 | End: 2022-07-11

## 2022-07-11 RX ORDER — NEEDLES, SAFETY 22GX1 1/2"
NEEDLE, DISPOSABLE MISCELLANEOUS
Qty: 24 PEN NEEDLE | Refills: 1 | Status: SHIPPED | OUTPATIENT
Start: 2022-07-11

## 2022-07-13 ENCOUNTER — TELEPHONE (OUTPATIENT)
Dept: INTERNAL MEDICINE CLINIC | Age: 62
End: 2022-07-13

## 2022-07-13 NOTE — TELEPHONE ENCOUNTER
She states that we had been ordering her B-12 to be done at [facility]. I did not see where we ordered it for an infusion.

## 2022-07-13 NOTE — TELEPHONE ENCOUNTER
----- Message from Misael Monsalve sent at 7/12/2022  3:31 PM EDT -----  Regarding: Scripts to [facility] 11 shots were ordered recently.

## 2022-07-14 RX ORDER — CYANOCOBALAMIN 1000 UG/ML
1000 INJECTION, SOLUTION INTRAMUSCULAR; SUBCUTANEOUS
Qty: 1 ML | Refills: 0 | Status: SHIPPED | OUTPATIENT
Start: 2022-07-14

## 2022-08-01 RX ORDER — NEBIVOLOL 5 MG/1
5 TABLET ORAL DAILY
Qty: 30 TABLET | Refills: 2 | Status: SHIPPED | OUTPATIENT
Start: 2022-08-01 | End: 2022-10-31

## 2022-09-05 DIAGNOSIS — Z78.0 POSTMENOPAUSAL: ICD-10-CM

## 2022-09-05 DIAGNOSIS — N89.8 VAGINAL DRYNESS: ICD-10-CM

## 2022-09-05 DIAGNOSIS — R23.2 HOT FLASHES: ICD-10-CM

## 2022-09-06 DIAGNOSIS — N89.8 VAGINAL DRYNESS: ICD-10-CM

## 2022-09-06 DIAGNOSIS — R23.2 HOT FLASHES: ICD-10-CM

## 2022-09-06 DIAGNOSIS — Z78.0 POSTMENOPAUSAL: ICD-10-CM

## 2022-09-06 RX ORDER — VENLAFAXINE HYDROCHLORIDE 37.5 MG/1
37.5 CAPSULE, EXTENDED RELEASE ORAL DAILY
Qty: 90 CAPSULE | Refills: 1 | Status: SHIPPED | OUTPATIENT
Start: 2022-09-06

## 2022-09-07 RX ORDER — ESTROGENS, CONJUGATED 0.3 MG/1
TABLET, FILM COATED ORAL
Qty: 30 TABLET | Refills: 2 | Status: SHIPPED | OUTPATIENT
Start: 2022-09-07

## 2022-12-05 DIAGNOSIS — Z78.0 POSTMENOPAUSAL: ICD-10-CM

## 2022-12-05 DIAGNOSIS — N89.8 VAGINAL DRYNESS: ICD-10-CM

## 2022-12-05 DIAGNOSIS — R23.2 HOT FLASHES: ICD-10-CM

## 2022-12-05 RX ORDER — ESTROGENS, CONJUGATED 0.3 MG/1
TABLET, FILM COATED ORAL
Qty: 30 TABLET | Refills: 2 | Status: SHIPPED | OUTPATIENT
Start: 2022-12-05

## 2023-02-27 DIAGNOSIS — Z78.0 POSTMENOPAUSAL: ICD-10-CM

## 2023-02-27 DIAGNOSIS — R23.2 HOT FLASHES: ICD-10-CM

## 2023-02-27 DIAGNOSIS — N89.8 VAGINAL DRYNESS: ICD-10-CM

## 2023-02-27 RX ORDER — VENLAFAXINE HYDROCHLORIDE 37.5 MG/1
37.5 CAPSULE, EXTENDED RELEASE ORAL DAILY
Qty: 90 CAPSULE | Refills: 1 | Status: SHIPPED | OUTPATIENT
Start: 2023-02-27

## 2023-02-27 RX ORDER — ESTROGENS, CONJUGATED 0.3 MG/1
TABLET, FILM COATED ORAL
Qty: 30 TABLET | Refills: 2 | Status: SHIPPED | OUTPATIENT
Start: 2023-02-27

## 2023-05-26 ENCOUNTER — OFFICE VISIT (OUTPATIENT)
Age: 63
End: 2023-05-26
Payer: COMMERCIAL

## 2023-05-26 ENCOUNTER — NURSE ONLY (OUTPATIENT)
Age: 63
End: 2023-05-26

## 2023-05-26 VITALS
SYSTOLIC BLOOD PRESSURE: 137 MMHG | TEMPERATURE: 97 F | BODY MASS INDEX: 24.27 KG/M2 | HEIGHT: 63 IN | RESPIRATION RATE: 18 BRPM | DIASTOLIC BLOOD PRESSURE: 71 MMHG | HEART RATE: 67 BPM | WEIGHT: 137 LBS | OXYGEN SATURATION: 99 %

## 2023-05-26 DIAGNOSIS — E53.8 B12 DEFICIENCY: ICD-10-CM

## 2023-05-26 DIAGNOSIS — E55.9 VITAMIN D DEFICIENCY: ICD-10-CM

## 2023-05-26 DIAGNOSIS — I10 PRIMARY HYPERTENSION: ICD-10-CM

## 2023-05-26 DIAGNOSIS — N89.8 OTHER SPECIFIED NONINFLAMMATORY DISORDERS OF VAGINA: ICD-10-CM

## 2023-05-26 DIAGNOSIS — E53.8 B12 DEFICIENCY: Primary | ICD-10-CM

## 2023-05-26 DIAGNOSIS — Z12.31 ENCOUNTER FOR SCREENING MAMMOGRAM FOR MALIGNANT NEOPLASM OF BREAST: ICD-10-CM

## 2023-05-26 DIAGNOSIS — R82.90 ABNORMAL URINALYSIS: Primary | ICD-10-CM

## 2023-05-26 DIAGNOSIS — R23.2 FLUSHING: ICD-10-CM

## 2023-05-26 PROCEDURE — 99214 OFFICE O/P EST MOD 30 MIN: CPT | Performed by: INTERNAL MEDICINE

## 2023-05-26 PROCEDURE — 3078F DIAST BP <80 MM HG: CPT | Performed by: INTERNAL MEDICINE

## 2023-05-26 PROCEDURE — 3075F SYST BP GE 130 - 139MM HG: CPT | Performed by: INTERNAL MEDICINE

## 2023-05-26 RX ORDER — GABAPENTIN 300 MG/1
300 CAPSULE ORAL 2 TIMES DAILY
Qty: 60 CAPSULE | Refills: 5 | COMMUNITY
Start: 2022-11-29 | End: 2023-05-28

## 2023-05-26 RX ORDER — NEBIVOLOL 5 MG/1
5 TABLET ORAL DAILY
COMMUNITY
Start: 2023-04-28

## 2023-05-26 RX ORDER — MYCOPHENOLATE MOFETIL 500 MG/1
500 TABLET ORAL DAILY
Qty: 30 TABLET | Refills: 11 | COMMUNITY
Start: 2022-12-21 | End: 2023-12-21

## 2023-05-26 RX ORDER — IBUPROFEN 800 MG/1
800 TABLET ORAL 3 TIMES DAILY
COMMUNITY
Start: 2023-04-28

## 2023-05-26 RX ORDER — MELATONIN
1000 DAILY
Qty: 30 TABLET | Refills: 5 | Status: SHIPPED | OUTPATIENT
Start: 2023-05-26

## 2023-05-26 RX ORDER — CYANOCOBALAMIN 1000 UG/ML
1000 INJECTION, SOLUTION INTRAMUSCULAR; SUBCUTANEOUS
Qty: 4 ML | Refills: 1 | Status: SHIPPED | OUTPATIENT
Start: 2023-05-26

## 2023-05-26 RX ORDER — CYANOCOBALAMIN 1000 UG/ML
1000 INJECTION, SOLUTION INTRAMUSCULAR; SUBCUTANEOUS ONCE
Qty: 1 ML | Refills: 0 | Status: SHIPPED | OUTPATIENT
Start: 2023-05-26 | End: 2023-05-26 | Stop reason: SDUPTHER

## 2023-05-26 RX ORDER — SECUKINUMAB 150 MG/ML
INJECTION SUBCUTANEOUS
COMMUNITY
Start: 2023-05-09

## 2023-05-26 RX ORDER — VENLAFAXINE HYDROCHLORIDE 37.5 MG/1
37.5 CAPSULE, EXTENDED RELEASE ORAL DAILY
COMMUNITY
Start: 2023-04-11

## 2023-05-26 RX ORDER — CYANOCOBALAMIN 1000 UG/ML
1000 INJECTION, SOLUTION INTRAMUSCULAR; SUBCUTANEOUS ONCE
Status: COMPLETED | OUTPATIENT
Start: 2023-05-26 | End: 2023-05-26

## 2023-05-26 RX ORDER — CONJUGATED ESTROGENS 0.3 MG/1
TABLET, FILM COATED ORAL
Qty: 30 TABLET | Refills: 2 | Status: SHIPPED | OUTPATIENT
Start: 2023-05-26

## 2023-05-26 RX ORDER — NITROFURANTOIN 25; 75 MG/1; MG/1
100 CAPSULE ORAL 2 TIMES DAILY
COMMUNITY
Start: 2022-06-30

## 2023-05-26 RX ORDER — CYANOCOBALAMIN 1000 UG/ML
1000 INJECTION, SOLUTION INTRAMUSCULAR; SUBCUTANEOUS
COMMUNITY
Start: 2022-07-14 | End: 2023-06-16 | Stop reason: DRUGHIGH

## 2023-05-26 RX ORDER — PANTOPRAZOLE SODIUM 40 MG/1
40 TABLET, DELAYED RELEASE ORAL DAILY
COMMUNITY
Start: 2022-08-09

## 2023-05-26 RX ADMIN — CYANOCOBALAMIN 1000 MCG: 1000 INJECTION, SOLUTION INTRAMUSCULAR; SUBCUTANEOUS at 09:42

## 2023-05-26 SDOH — ECONOMIC STABILITY: FOOD INSECURITY: WITHIN THE PAST 12 MONTHS, YOU WORRIED THAT YOUR FOOD WOULD RUN OUT BEFORE YOU GOT MONEY TO BUY MORE.: NEVER TRUE

## 2023-05-26 SDOH — ECONOMIC STABILITY: HOUSING INSECURITY
IN THE LAST 12 MONTHS, WAS THERE A TIME WHEN YOU DID NOT HAVE A STEADY PLACE TO SLEEP OR SLEPT IN A SHELTER (INCLUDING NOW)?: NO

## 2023-05-26 SDOH — ECONOMIC STABILITY: FOOD INSECURITY: WITHIN THE PAST 12 MONTHS, THE FOOD YOU BOUGHT JUST DIDN'T LAST AND YOU DIDN'T HAVE MONEY TO GET MORE.: NEVER TRUE

## 2023-05-26 SDOH — ECONOMIC STABILITY: INCOME INSECURITY: HOW HARD IS IT FOR YOU TO PAY FOR THE VERY BASICS LIKE FOOD, HOUSING, MEDICAL CARE, AND HEATING?: NOT HARD AT ALL

## 2023-05-26 ASSESSMENT — PATIENT HEALTH QUESTIONNAIRE - PHQ9
SUM OF ALL RESPONSES TO PHQ QUESTIONS 1-9: 0
SUM OF ALL RESPONSES TO PHQ QUESTIONS 1-9: 0
SUM OF ALL RESPONSES TO PHQ9 QUESTIONS 1 & 2: 0
2. FEELING DOWN, DEPRESSED OR HOPELESS: 0
1. LITTLE INTEREST OR PLEASURE IN DOING THINGS: 0
SUM OF ALL RESPONSES TO PHQ QUESTIONS 1-9: 0
SUM OF ALL RESPONSES TO PHQ QUESTIONS 1-9: 0

## 2023-05-26 ASSESSMENT — SOCIAL DETERMINANTS OF HEALTH (SDOH): HOW HARD IS IT FOR YOU TO PAY FOR THE VERY BASICS LIKE FOOD, HOUSING, MEDICAL CARE, AND HEATING?: NOT HARD AT ALL

## 2023-05-26 NOTE — PROGRESS NOTES
1. \"Have you been to the ER, urgent care clinic since your last visit? Hospitalized since your last visit? yes, Urgent Care, [facility], Had Covid. 2. \"Have you seen or consulted any other health care providers outside of the [address] [place] [address] since your last visit? yes    3. For patients aged 39-70: Has the patient had a colonoscopy / FIT/ Cologuard? Yes - no Care Gap present      If the patient is female:    4. For patients aged 41-77: Has the patient had a mammogram within the past 2 years? No      5. For patients aged 21-65: Has the patient had a pap smear?  Yes - no Care Gap present

## 2023-05-26 NOTE — PATIENT INSTRUCTIONS
Monitor lymph node under arm if persists will order US feels benign   Let me know prior to the mammogram  B12 shot today

## 2023-05-26 NOTE — PROGRESS NOTES
Ms. Tiffanie Ramirez is presenting to follow up     CC:  Physical       HPI:    61 yo woman with a hx of autoimmune disease (SLE/Sjogren's (possibly drug induced from Remicade in past). Her inflammatory joint pain is suggestive of seronegative arthritis c/w PsA given h/o psoriasis. . 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 difficile diarrhea     Drug-induced lupus erythematosus     ramicade induced    HTN (hypertension)     Psoriasis     RA (rheumatoid arthritis) ([facility] [address]     Rosacea         Past Surgical History:   Procedure Laterality Date    HX PARTIAL HYSTERECTOMY  2013    had fibroids       Allergies   Allergen Reactions    Diphenhydramine Hcl Palpitations    Sulfa (Sulfonamide Antibiotics) Hives and Rash       Current Outpatient Medications on File Prior to Visit   Medication Sig Dispense Refill    nebivoloL (Bystolic) 5 mg tablet Take 1 Tablet by mouth daily. 30 Tablet 2    venlafaxine-SR (EFFEXOR-XR) 37.5 mg capsule Take 1 Capsule by mouth daily. 90 Capsule 1    mycophenolate (CELLCEPT) 500 mg tablet Take 500 mg by mouth two (2) times a day. secukinumab (COSENTYX PEN) 150 mg/mL pnij by SubCUTAneous route.      gabapentin (NEURONTIN) 300 mg capsule Take 300 mg by mouth three (3) times daily. pantoprazole (PROTONIX) 40 mg tablet Take 40 mg by mouth daily. metroNIDAZOLE (METROCREAM) 0.75 % topical cream Apply  to affected area two (2) times a day.  Use a thin layer to affected areas after

## 2023-05-27 LAB
APPEARANCE UR: CLEAR
BACTERIA URNS QL MICRO: NEGATIVE /HPF
BILIRUB UR QL: NEGATIVE
COLOR UR: ABNORMAL
EPITH CASTS URNS QL MICRO: ABNORMAL /LPF
GLUCOSE UR STRIP.AUTO-MCNC: NEGATIVE MG/DL
HGB UR QL STRIP: NEGATIVE
HYALINE CASTS URNS QL MICRO: ABNORMAL /LPF (ref 0–5)
KETONES UR QL STRIP.AUTO: NEGATIVE MG/DL
LEUKOCYTE ESTERASE UR QL STRIP.AUTO: ABNORMAL
NITRITE UR QL STRIP.AUTO: NEGATIVE
PH UR STRIP: 6 (ref 5–8)
PROT UR STRIP-MCNC: NEGATIVE MG/DL
RBC #/AREA URNS HPF: ABNORMAL /HPF (ref 0–5)
SP GR UR REFRACTOMETRY: 1.01 (ref 1–1.03)
UROBILINOGEN UR QL STRIP.AUTO: 0.2 EU/DL (ref 0.2–1)
VIT B12 SERPL-MCNC: 218 PG/ML (ref 193–986)
WBC URNS QL MICRO: ABNORMAL /HPF (ref 0–4)

## 2023-06-01 LAB — METHYLMALONATE SERPL-SCNC: 179 NMOL/L (ref 0–378)

## 2023-06-05 ENCOUNTER — PATIENT MESSAGE (OUTPATIENT)
Age: 63
End: 2023-06-05

## 2023-06-05 DIAGNOSIS — R59.1 LYMPHADENOPATHY: Primary | ICD-10-CM

## 2023-06-05 DIAGNOSIS — Z12.31 ENCOUNTER FOR SCREENING MAMMOGRAM FOR MALIGNANT NEOPLASM OF BREAST: ICD-10-CM

## 2023-06-05 NOTE — TELEPHONE ENCOUNTER
Aimee Party 6/5/2023 10:50 AM EDT      ----- Message -----  From: Lynn Christy  Sent: 6/5/2023 10:06 AM EDT  To: , *  Subject: Carlos Alberto Nayak, I still have the same swelling under left arm. Does not hurt, remains soft. You wanted me to let you know.   Thank you,  Kelsey Moura

## 2023-06-08 ENCOUNTER — HOSPITAL ENCOUNTER (OUTPATIENT)
Facility: HOSPITAL | Age: 63
Discharge: HOME | End: 2023-06-08
Attending: INTERNAL MEDICINE
Payer: COMMERCIAL

## 2023-06-08 DIAGNOSIS — Z12.31 ENCOUNTER FOR SCREENING MAMMOGRAM FOR MALIGNANT NEOPLASM OF BREAST: ICD-10-CM

## 2023-06-08 PROCEDURE — 77063 BREAST TOMOSYNTHESIS BI: CPT

## 2023-08-03 ENCOUNTER — OFFICE VISIT (OUTPATIENT)
Age: 63
End: 2023-08-03
Payer: COMMERCIAL

## 2023-08-03 VITALS
HEIGHT: 63 IN | OXYGEN SATURATION: 99 % | BODY MASS INDEX: 24.34 KG/M2 | WEIGHT: 137.4 LBS | DIASTOLIC BLOOD PRESSURE: 83 MMHG | HEART RATE: 54 BPM | RESPIRATION RATE: 14 BRPM | TEMPERATURE: 96.9 F | SYSTOLIC BLOOD PRESSURE: 138 MMHG

## 2023-08-03 DIAGNOSIS — G62.9 POLYNEUROPATHY, UNSPECIFIED: ICD-10-CM

## 2023-08-03 DIAGNOSIS — R59.1 LYMPHADENOPATHY: Primary | ICD-10-CM

## 2023-08-03 DIAGNOSIS — M25.422 ELBOW SWELLING, LEFT: ICD-10-CM

## 2023-08-03 PROCEDURE — 3075F SYST BP GE 130 - 139MM HG: CPT | Performed by: INTERNAL MEDICINE

## 2023-08-03 PROCEDURE — 99214 OFFICE O/P EST MOD 30 MIN: CPT | Performed by: INTERNAL MEDICINE

## 2023-08-03 PROCEDURE — 3079F DIAST BP 80-89 MM HG: CPT | Performed by: INTERNAL MEDICINE

## 2023-08-03 RX ORDER — GABAPENTIN 300 MG/1
300 CAPSULE ORAL 2 TIMES DAILY
Qty: 60 CAPSULE | Refills: 5 | Status: SHIPPED | OUTPATIENT
Start: 2023-08-03 | End: 2024-04-06

## 2023-08-03 RX ORDER — AZITHROMYCIN 250 MG/1
250 TABLET, FILM COATED ORAL SEE ADMIN INSTRUCTIONS
Qty: 6 TABLET | Refills: 0 | Status: SHIPPED | OUTPATIENT
Start: 2023-08-03 | End: 2023-08-08

## 2023-08-03 SDOH — ECONOMIC STABILITY: FOOD INSECURITY: WITHIN THE PAST 12 MONTHS, THE FOOD YOU BOUGHT JUST DIDN'T LAST AND YOU DIDN'T HAVE MONEY TO GET MORE.: NEVER TRUE

## 2023-08-03 SDOH — ECONOMIC STABILITY: INCOME INSECURITY: HOW HARD IS IT FOR YOU TO PAY FOR THE VERY BASICS LIKE FOOD, HOUSING, MEDICAL CARE, AND HEATING?: NOT HARD AT ALL

## 2023-08-03 SDOH — ECONOMIC STABILITY: FOOD INSECURITY: WITHIN THE PAST 12 MONTHS, YOU WORRIED THAT YOUR FOOD WOULD RUN OUT BEFORE YOU GOT MONEY TO BUY MORE.: NEVER TRUE

## 2023-08-03 ASSESSMENT — PATIENT HEALTH QUESTIONNAIRE - PHQ9
2. FEELING DOWN, DEPRESSED OR HOPELESS: 0
SUM OF ALL RESPONSES TO PHQ QUESTIONS 1-9: 0

## 2023-08-20 DIAGNOSIS — R23.2 FLUSHING: ICD-10-CM

## 2023-08-20 DIAGNOSIS — Z78.0 ASYMPTOMATIC MENOPAUSAL STATE: ICD-10-CM

## 2023-08-21 RX ORDER — VENLAFAXINE HYDROCHLORIDE 37.5 MG/1
CAPSULE, EXTENDED RELEASE ORAL
Qty: 90 CAPSULE | Refills: 1 | Status: SHIPPED | OUTPATIENT
Start: 2023-08-21

## 2023-09-18 DIAGNOSIS — E53.8 B12 DEFICIENCY: ICD-10-CM

## 2023-09-18 RX ORDER — CYANOCOBALAMIN 1000 UG/ML
1000 INJECTION, SOLUTION INTRAMUSCULAR; SUBCUTANEOUS
Qty: 3 ML | Refills: 1 | Status: SHIPPED | OUTPATIENT
Start: 2023-09-18

## 2023-09-22 ENCOUNTER — HOSPITAL ENCOUNTER (OUTPATIENT)
Facility: HOSPITAL | Age: 63
End: 2023-09-22
Attending: INTERNAL MEDICINE
Payer: COMMERCIAL

## 2023-09-22 ENCOUNTER — HOSPITAL ENCOUNTER (OUTPATIENT)
Facility: HOSPITAL | Age: 63
Discharge: HOME | End: 2023-09-22
Attending: INTERNAL MEDICINE
Payer: COMMERCIAL

## 2023-09-22 VITALS — BODY MASS INDEX: 24.27 KG/M2 | WEIGHT: 137 LBS | HEIGHT: 63 IN

## 2023-09-22 DIAGNOSIS — R59.1 LYMPHADENOPATHY: ICD-10-CM

## 2023-09-22 PROCEDURE — 76882 US LMTD JT/FCL EVL NVASC XTR: CPT

## 2023-09-22 PROCEDURE — G0279 TOMOSYNTHESIS, MAMMO: HCPCS

## 2023-10-22 DIAGNOSIS — I10 ESSENTIAL (PRIMARY) HYPERTENSION: ICD-10-CM

## 2023-10-23 RX ORDER — NEBIVOLOL 5 MG/1
5 TABLET ORAL DAILY
Qty: 90 TABLET | Refills: 1 | Status: SHIPPED | OUTPATIENT
Start: 2023-10-23

## 2023-11-07 ENCOUNTER — PATIENT MESSAGE (OUTPATIENT)
Age: 63
End: 2023-11-07

## 2023-11-15 ENCOUNTER — TELEMEDICINE (OUTPATIENT)
Age: 63
End: 2023-11-15
Payer: COMMERCIAL

## 2023-11-15 DIAGNOSIS — Z71.84 TRAVEL ADVICE ENCOUNTER: Primary | ICD-10-CM

## 2023-11-15 PROCEDURE — 99214 OFFICE O/P EST MOD 30 MIN: CPT | Performed by: INTERNAL MEDICINE

## 2023-11-15 RX ORDER — ATOVAQUONE AND PROGUANIL HYDROCHLORIDE 250; 100 MG/1; MG/1
1 TABLET, FILM COATED ORAL DAILY
Qty: 20 TABLET | Refills: 0 | Status: SHIPPED | OUTPATIENT
Start: 2023-11-15

## 2023-11-15 RX ORDER — AZITHROMYCIN 250 MG/1
250 TABLET, FILM COATED ORAL SEE ADMIN INSTRUCTIONS
Qty: 6 TABLET | Refills: 0 | Status: SHIPPED | OUTPATIENT
Start: 2023-11-15 | End: 2023-11-20

## 2023-11-15 SDOH — ECONOMIC STABILITY: FOOD INSECURITY: WITHIN THE PAST 12 MONTHS, THE FOOD YOU BOUGHT JUST DIDN'T LAST AND YOU DIDN'T HAVE MONEY TO GET MORE.: NEVER TRUE

## 2023-11-15 SDOH — ECONOMIC STABILITY: INCOME INSECURITY: HOW HARD IS IT FOR YOU TO PAY FOR THE VERY BASICS LIKE FOOD, HOUSING, MEDICAL CARE, AND HEATING?: NOT HARD AT ALL

## 2023-11-15 SDOH — ECONOMIC STABILITY: FOOD INSECURITY: WITHIN THE PAST 12 MONTHS, YOU WORRIED THAT YOUR FOOD WOULD RUN OUT BEFORE YOU GOT MONEY TO BUY MORE.: NEVER TRUE

## 2023-11-15 ASSESSMENT — PATIENT HEALTH QUESTIONNAIRE - PHQ9
SUM OF ALL RESPONSES TO PHQ QUESTIONS 1-9: 0
SUM OF ALL RESPONSES TO PHQ QUESTIONS 1-9: 0
1. LITTLE INTEREST OR PLEASURE IN DOING THINGS: 0
SUM OF ALL RESPONSES TO PHQ9 QUESTIONS 1 & 2: 0
SUM OF ALL RESPONSES TO PHQ QUESTIONS 1-9: 0
SUM OF ALL RESPONSES TO PHQ QUESTIONS 1-9: 0
2. FEELING DOWN, DEPRESSED OR HOPELESS: 0

## 2023-11-15 NOTE — PROGRESS NOTES
1. \"Have you been to the ER, urgent care clinic since your last visit? Hospitalized since your last visit? \" No    2. \"Have you seen or consulted any other health care providers outside of the [address] [place] [address] since your last visit? \" No     3. For patients aged 43-73: Has the patient had a colonoscopy / FIT/ Cologuard? Yes - no Care Gap present      If the patient is female:    4. For patients aged 43-66: Has the patient had a mammogram within the past 2 years? Yes - no Care Gap present      5. For patients aged 21-65: Has the patient had a pap smear?  Yes - no Care Gap present
[x] No gaze palsy        [] Abnormal -          Skin:        [x] No significant exanthematous lesions or discoloration noted on facial skin         [] Abnormal -            Psychiatric:       [x] Normal Affect [] Abnormal -        [x] No Hallucinations    Other pertinent observable physical exam findings:-           --Jamarcus Wild MD

## 2023-11-15 NOTE — TELEPHONE ENCOUNTER
Left voicemail for patient to give our office a call back to reschedule virtual appointment for today, 11/15.

## 2023-11-26 DIAGNOSIS — I10 ESSENTIAL (PRIMARY) HYPERTENSION: ICD-10-CM

## 2023-11-27 RX ORDER — NEBIVOLOL 5 MG/1
5 TABLET ORAL DAILY
Qty: 90 TABLET | Refills: 1 | Status: SHIPPED | OUTPATIENT
Start: 2023-11-27 | End: 2023-11-30 | Stop reason: SDUPTHER

## 2023-11-30 DIAGNOSIS — I10 ESSENTIAL (PRIMARY) HYPERTENSION: ICD-10-CM

## 2023-11-30 RX ORDER — NEBIVOLOL 5 MG/1
5 TABLET ORAL DAILY
Qty: 90 TABLET | Refills: 1 | Status: SHIPPED | OUTPATIENT
Start: 2023-11-30

## 2023-11-30 NOTE — TELEPHONE ENCOUNTER
----- Message from Sandy Hodge sent at 11/30/2023  1:10 PM EST -----  Regarding: Bistolic  Contact: [phone]  [facility] is saying they do not have this script. Was it sent to [employer]?

## 2023-12-29 ENCOUNTER — PATIENT MESSAGE (OUTPATIENT)
Age: 63
End: 2023-12-29

## 2023-12-29 DIAGNOSIS — Z71.84 TRAVEL ADVICE ENCOUNTER: Primary | ICD-10-CM

## 2023-12-29 NOTE — TELEPHONE ENCOUNTER
From: Vinny Jesus  To: Dr. Miguelangel Maradiaga: 12/29/2023 3:06 PM EST  Subject: Travel vaccines     Dr. Candace Holder, I do not see the adult polio and typhoid orals vaccines ordered. How do I get those?   Thank you

## 2024-02-11 DIAGNOSIS — Z78.0 ASYMPTOMATIC MENOPAUSAL STATE: ICD-10-CM

## 2024-02-11 DIAGNOSIS — R23.2 FLUSHING: ICD-10-CM

## 2024-02-12 DIAGNOSIS — I10 ESSENTIAL (PRIMARY) HYPERTENSION: ICD-10-CM

## 2024-02-12 RX ORDER — NEBIVOLOL 5 MG/1
5 TABLET ORAL DAILY
Qty: 90 TABLET | Refills: 1 | Status: SHIPPED | OUTPATIENT
Start: 2024-02-12

## 2024-02-12 RX ORDER — VENLAFAXINE HYDROCHLORIDE 37.5 MG/1
CAPSULE, EXTENDED RELEASE ORAL
Qty: 90 CAPSULE | Refills: 1 | Status: SHIPPED | OUTPATIENT
Start: 2024-02-12

## 2024-02-28 DIAGNOSIS — R23.2 FLUSHING: ICD-10-CM

## 2024-02-28 DIAGNOSIS — Z78.0 ASYMPTOMATIC MENOPAUSAL STATE: ICD-10-CM

## 2024-02-28 RX ORDER — VENLAFAXINE HYDROCHLORIDE 37.5 MG/1
37.5 CAPSULE, EXTENDED RELEASE ORAL DAILY
Qty: 90 CAPSULE | Refills: 1 | Status: SHIPPED | OUTPATIENT
Start: 2024-02-28

## 2024-03-09 DIAGNOSIS — L71.9 ROSACEA, UNSPECIFIED: ICD-10-CM

## 2024-04-01 ENCOUNTER — OFFICE VISIT (OUTPATIENT)
Age: 64
End: 2024-04-01
Payer: COMMERCIAL

## 2024-04-01 VITALS
HEIGHT: 63 IN | HEART RATE: 72 BPM | SYSTOLIC BLOOD PRESSURE: 124 MMHG | TEMPERATURE: 97.6 F | WEIGHT: 138.8 LBS | DIASTOLIC BLOOD PRESSURE: 76 MMHG | BODY MASS INDEX: 24.59 KG/M2

## 2024-04-01 DIAGNOSIS — R51.9 ACUTE NONINTRACTABLE HEADACHE, UNSPECIFIED HEADACHE TYPE: ICD-10-CM

## 2024-04-01 DIAGNOSIS — E55.9 VITAMIN D DEFICIENCY: ICD-10-CM

## 2024-04-01 DIAGNOSIS — E53.8 B12 DEFICIENCY: ICD-10-CM

## 2024-04-01 DIAGNOSIS — Z12.31 ENCOUNTER FOR SCREENING MAMMOGRAM FOR MALIGNANT NEOPLASM OF BREAST: Primary | ICD-10-CM

## 2024-04-01 DIAGNOSIS — M06.9 RHEUMATOID ARTHRITIS INVOLVING MULTIPLE SITES, UNSPECIFIED WHETHER RHEUMATOID FACTOR PRESENT (HCC): ICD-10-CM

## 2024-04-01 DIAGNOSIS — E78.1 HIGH TRIGLYCERIDES: ICD-10-CM

## 2024-04-01 DIAGNOSIS — I10 ESSENTIAL (PRIMARY) HYPERTENSION: ICD-10-CM

## 2024-04-01 PROCEDURE — 3074F SYST BP LT 130 MM HG: CPT | Performed by: INTERNAL MEDICINE

## 2024-04-01 PROCEDURE — 99214 OFFICE O/P EST MOD 30 MIN: CPT | Performed by: INTERNAL MEDICINE

## 2024-04-01 PROCEDURE — 3078F DIAST BP <80 MM HG: CPT | Performed by: INTERNAL MEDICINE

## 2024-04-01 RX ORDER — CYANOCOBALAMIN 1000 UG/ML
1000 INJECTION, SOLUTION INTRAMUSCULAR; SUBCUTANEOUS
Qty: 3 ML | Refills: 5 | Status: SHIPPED | OUTPATIENT
Start: 2024-04-01

## 2024-04-01 RX ORDER — NEBIVOLOL 5 MG/1
5 TABLET ORAL DAILY
Qty: 90 TABLET | Refills: 1 | Status: SHIPPED | OUTPATIENT
Start: 2024-04-01

## 2024-04-01 ASSESSMENT — PATIENT HEALTH QUESTIONNAIRE - PHQ9
SUM OF ALL RESPONSES TO PHQ QUESTIONS 1-9: 0
SUM OF ALL RESPONSES TO PHQ QUESTIONS 1-9: 0
2. FEELING DOWN, DEPRESSED OR HOPELESS: NOT AT ALL
SUM OF ALL RESPONSES TO PHQ QUESTIONS 1-9: 0
SUM OF ALL RESPONSES TO PHQ QUESTIONS 1-9: 0
1. LITTLE INTEREST OR PLEASURE IN DOING THINGS: NOT AT ALL
SUM OF ALL RESPONSES TO PHQ9 QUESTIONS 1 & 2: 0

## 2024-04-01 NOTE — PROGRESS NOTES
Chief Complaint   Patient presents with    Hypertension    Headache     Multiple times a day,everyday (constant pressure)4/10     \"Have you been to the ER, urgent care clinic since your last visit?  Hospitalized since your last visit?\"    NO    “Have you seen or consulted any other health care providers outside of [facility] since your last visit?”    NO        “Have you had a pap smear?”    NO    Date of last Cervical Cancer screen (HPV or PAP): 10/8/2018         
education level: Not on file   Occupational History    Not on file   Tobacco Use    Smoking status: Never     Passive exposure: Never    Smokeless tobacco: Never   Vaping Use    Vaping Use: Never used   Substance and Sexual Activity    Alcohol use: Yes    Drug use: No    Sexual activity: Yes     Partners: Male   Other Topics Concern    Not on file   Social History Narrative    Not on file     Social Determinants of Health     Financial Resource Strain: Low Risk  (11/15/2023)    Overall Financial Resource Strain (CARDIA)     Difficulty of Paying Living Expenses: Not hard at all   Food Insecurity: Not on file (11/15/2023)   Transportation Needs: Unknown (11/15/2023)    PRAPARE - Transportation     Lack of Transportation (Medical): Not on file     Lack of Transportation (Non-Medical): No   Physical Activity: Not on file   Stress: Not on file   Social Connections: Not on file   Intimate Partner Violence: Not on file   Housing Stability: Unknown (11/15/2023)    Housing Stability Vital Sign     Unable to Pay for Housing in the Last Year: Not on file     Number of Places Lived in the Last Year: Not on file     Unstable Housing in the Last Year: No       /76 (Site: Right Upper Arm, Position: Sitting)   Pulse 72   Temp 97.6 °F (36.4 °C)   Ht 1.6 m (5' 3\")   Wt 63 kg (138 lb 12.8 oz)   BMI 24.59 kg/m²   General:  Well appearing female no acute distress  HEENT:   PERRL,normal conjunctiva.   Neck:  Supple. Thyroid normal size, nontender, without nodules.  No carotid bruit. No masses or lymphadenopathy  Lymph exam: left axillae with fullness / soft, no hard lymphadenopathy noted.   Left elbow with swelling over lateral aspect of joint, no distinct lymphadenopathy noted, but there is swelling in the area  No warmth  Mild tenderness  Respiratory: no respiratory distress,  no wheezing, no rhonchi, no rales. No chest wall tenderness.  Cardiovascular:  RRR, normal S1S2, no murmur.    Gastrointestinal: normal bowel sounds,

## 2024-04-02 LAB
25(OH)D3+25(OH)D2 SERPL-MCNC: 27 NG/ML (ref 30–100)
ALBUMIN SERPL-MCNC: 4.5 G/DL (ref 3.9–4.9)
ALBUMIN/GLOB SERPL: 2 {RATIO} (ref 1.2–2.2)
ALP SERPL-CCNC: 70 IU/L (ref 44–121)
ALT SERPL-CCNC: 10 IU/L (ref 0–32)
APPEARANCE UR: CLEAR
AST SERPL-CCNC: 17 IU/L (ref 0–40)
BASOPHILS # BLD AUTO: 0 X10E3/UL (ref 0–0.2)
BASOPHILS NFR BLD AUTO: 1 %
BILIRUB SERPL-MCNC: 0.6 MG/DL (ref 0–1.2)
BILIRUB UR QL STRIP: NEGATIVE
BUN SERPL-MCNC: 16 MG/DL (ref 8–27)
BUN/CREAT SERPL: 17 (ref 12–28)
CALCIUM SERPL-MCNC: 9.9 MG/DL (ref 8.7–10.3)
CHLORIDE SERPL-SCNC: 102 MMOL/L (ref 96–106)
CHOLEST SERPL-MCNC: 188 MG/DL (ref 100–199)
CO2 SERPL-SCNC: 24 MMOL/L (ref 20–29)
COLOR UR: YELLOW
CREAT SERPL-MCNC: 0.93 MG/DL (ref 0.57–1)
EGFRCR SERPLBLD CKD-EPI 2021: 69 ML/MIN/1.73
EOSINOPHIL # BLD AUTO: 0.1 X10E3/UL (ref 0–0.4)
EOSINOPHIL NFR BLD AUTO: 3 %
ERYTHROCYTE [DISTWIDTH] IN BLOOD BY AUTOMATED COUNT: 11.9 % (ref 11.7–15.4)
GLOBULIN SER CALC-MCNC: 2.3 G/DL (ref 1.5–4.5)
GLUCOSE SERPL-MCNC: 90 MG/DL (ref 70–99)
GLUCOSE UR QL STRIP: NEGATIVE
HCT VFR BLD AUTO: 40.6 % (ref 34–46.6)
HDLC SERPL-MCNC: 84 MG/DL
HGB BLD-MCNC: 12.8 G/DL (ref 11.1–15.9)
HGB UR QL STRIP: NEGATIVE
IMM GRANULOCYTES # BLD AUTO: 0 X10E3/UL (ref 0–0.1)
IMM GRANULOCYTES NFR BLD AUTO: 0 %
KETONES UR QL STRIP: NEGATIVE
LDLC SERPL CALC-MCNC: 85 MG/DL (ref 0–99)
LEUKOCYTE ESTERASE UR QL STRIP: NEGATIVE
LYMPHOCYTES # BLD AUTO: 1 X10E3/UL (ref 0.7–3.1)
LYMPHOCYTES NFR BLD AUTO: 29 %
MCH RBC QN AUTO: 29 PG (ref 26.6–33)
MCHC RBC AUTO-ENTMCNC: 31.5 G/DL (ref 31.5–35.7)
MCV RBC AUTO: 92 FL (ref 79–97)
MONOCYTES # BLD AUTO: 0.3 X10E3/UL (ref 0.1–0.9)
MONOCYTES NFR BLD AUTO: 7 %
NEUTROPHILS # BLD AUTO: 2.1 X10E3/UL (ref 1.4–7)
NEUTROPHILS NFR BLD AUTO: 60 %
NITRITE UR QL STRIP: NEGATIVE
PH UR STRIP: 6.5 [PH] (ref 5–7.5)
PLATELET # BLD AUTO: 325 X10E3/UL (ref 150–450)
POTASSIUM SERPL-SCNC: 4.5 MMOL/L (ref 3.5–5.2)
PROT SERPL-MCNC: 6.8 G/DL (ref 6–8.5)
PROT UR QL STRIP: NEGATIVE
RBC # BLD AUTO: 4.41 X10E6/UL (ref 3.77–5.28)
SODIUM SERPL-SCNC: 141 MMOL/L (ref 134–144)
SP GR UR STRIP: 1.01 (ref 1–1.03)
SPECIMEN STATUS REPORT: NORMAL
TRIGL SERPL-MCNC: 111 MG/DL (ref 0–149)
UROBILINOGEN UR STRIP-MCNC: 0.2 MG/DL (ref 0.2–1)
VLDLC SERPL CALC-MCNC: 19 MG/DL (ref 5–40)
WBC # BLD AUTO: 3.4 X10E3/UL (ref 3.4–10.8)

## 2024-04-03 LAB — VIT B12 SERPL-MCNC: 274 PG/ML (ref 232–1245)

## 2024-04-29 DIAGNOSIS — I10 ESSENTIAL (PRIMARY) HYPERTENSION: ICD-10-CM

## 2024-04-29 RX ORDER — NEBIVOLOL 5 MG/1
5 TABLET ORAL DAILY
Qty: 30 TABLET | Refills: 2 | Status: SHIPPED | OUTPATIENT
Start: 2024-04-29

## 2024-05-27 DIAGNOSIS — N89.8 OTHER SPECIFIED NONINFLAMMATORY DISORDERS OF VAGINA: ICD-10-CM

## 2024-05-27 DIAGNOSIS — R23.2 FLUSHING: ICD-10-CM

## 2024-05-28 RX ORDER — CONJUGATED ESTROGENS 0.3 MG/1
TABLET, FILM COATED ORAL
Qty: 30 TABLET | Refills: 2 | OUTPATIENT
Start: 2024-05-28

## 2024-05-30 DIAGNOSIS — I10 ESSENTIAL (PRIMARY) HYPERTENSION: ICD-10-CM

## 2024-05-30 RX ORDER — NEBIVOLOL 5 MG/1
5 TABLET ORAL DAILY
Qty: 30 TABLET | Refills: 2 | Status: SHIPPED | OUTPATIENT
Start: 2024-05-30

## 2024-05-30 NOTE — TELEPHONE ENCOUNTER
PCP: Melania Buck MD    Last appt:   4/1/2024    Future Appointments   Date Time Provider Department Center   6/11/2024  7:00 AM [facility]       Requested Prescriptions     Pending Prescriptions Disp Refills    nebivolol (BYSTOLIC) 5 MG tablet 30 tablet 2     Sig: Take 1 tablet by mouth daily

## 2024-06-11 ENCOUNTER — HOSPITAL ENCOUNTER (OUTPATIENT)
Facility: HOSPITAL | Age: 64
Discharge: HOME | End: 2024-06-14
Payer: COMMERCIAL

## 2024-06-11 VITALS — BODY MASS INDEX: 24.45 KG/M2 | HEIGHT: 63 IN | WEIGHT: 138 LBS

## 2024-06-11 DIAGNOSIS — Z12.31 ENCOUNTER FOR SCREENING MAMMOGRAM FOR MALIGNANT NEOPLASM OF BREAST: ICD-10-CM

## 2024-06-11 PROCEDURE — 77063 BREAST TOMOSYNTHESIS BI: CPT

## 2024-09-19 ENCOUNTER — TELEMEDICINE (OUTPATIENT)
Age: 64
End: 2024-09-19

## 2024-09-19 DIAGNOSIS — L03.113 CELLULITIS OF RIGHT UPPER EXTREMITY: Primary | ICD-10-CM

## 2024-09-19 DIAGNOSIS — L23.7 POISON IVY DERMATITIS: ICD-10-CM

## 2024-09-19 RX ORDER — CEPHALEXIN 500 MG/1
500 CAPSULE ORAL 3 TIMES DAILY
Qty: 21 CAPSULE | Refills: 0 | Status: SHIPPED | OUTPATIENT
Start: 2024-09-19

## 2024-09-19 RX ORDER — PREDNISONE 20 MG/1
50 TABLET ORAL DAILY
Qty: 14 TABLET | Refills: 0 | Status: SHIPPED | OUTPATIENT
Start: 2024-09-19 | End: 2024-09-24

## 2024-10-14 DIAGNOSIS — R23.2 FLUSHING: ICD-10-CM

## 2024-10-14 DIAGNOSIS — Z78.0 ASYMPTOMATIC MENOPAUSAL STATE: ICD-10-CM

## 2024-10-15 RX ORDER — VENLAFAXINE HYDROCHLORIDE 37.5 MG/1
CAPSULE, EXTENDED RELEASE ORAL DAILY
Qty: 90 CAPSULE | Refills: 1 | Status: SHIPPED | OUTPATIENT
Start: 2024-10-15

## 2024-12-02 DIAGNOSIS — E55.9 VITAMIN D DEFICIENCY: ICD-10-CM

## 2024-12-02 DIAGNOSIS — E53.8 B12 DEFICIENCY: ICD-10-CM

## 2024-12-02 DIAGNOSIS — Z78.0 ASYMPTOMATIC MENOPAUSAL STATE: ICD-10-CM

## 2024-12-02 DIAGNOSIS — L71.9 ROSACEA, UNSPECIFIED: ICD-10-CM

## 2024-12-02 DIAGNOSIS — G62.9 POLYNEUROPATHY, UNSPECIFIED: ICD-10-CM

## 2024-12-02 DIAGNOSIS — R23.2 FLUSHING: ICD-10-CM

## 2024-12-02 DIAGNOSIS — I10 ESSENTIAL (PRIMARY) HYPERTENSION: ICD-10-CM

## 2024-12-02 DIAGNOSIS — N95.1 POST MENOPAUSAL SYNDROME: ICD-10-CM

## 2024-12-02 RX ORDER — CHOLECALCIFEROL (VITAMIN D3) 25 MCG
1000 TABLET ORAL DAILY
Qty: 90 TABLET | Refills: 2 | Status: SHIPPED | OUTPATIENT
Start: 2024-12-02

## 2024-12-02 RX ORDER — ESTRADIOL 0.07 MG/D
1 FILM, EXTENDED RELEASE TRANSDERMAL
Qty: 8 PATCH | Refills: 3 | Status: SHIPPED | OUTPATIENT
Start: 2024-12-02

## 2024-12-02 RX ORDER — GABAPENTIN 300 MG/1
300 CAPSULE ORAL 2 TIMES DAILY
Qty: 180 CAPSULE | Refills: 1 | Status: SHIPPED | OUTPATIENT
Start: 2024-12-02 | End: 2025-08-06

## 2024-12-02 RX ORDER — VENLAFAXINE HYDROCHLORIDE 37.5 MG/1
37.5 CAPSULE, EXTENDED RELEASE ORAL DAILY
Qty: 90 CAPSULE | Refills: 1 | Status: SHIPPED | OUTPATIENT
Start: 2024-12-02

## 2024-12-02 RX ORDER — NEBIVOLOL 5 MG/1
5 TABLET ORAL DAILY
Qty: 90 TABLET | Refills: 2 | Status: SHIPPED | OUTPATIENT
Start: 2024-12-02

## 2024-12-02 RX ORDER — CYANOCOBALAMIN 1000 UG/ML
1000 INJECTION, SOLUTION INTRAMUSCULAR; SUBCUTANEOUS
Qty: 3 ML | Refills: 5 | Status: SHIPPED | OUTPATIENT
Start: 2024-12-02

## 2024-12-02 NOTE — TELEPHONE ENCOUNTER
PCP: Melania Buck MD    Last appt:   9/19/2024    No future appointments.    Requested Prescriptions     Pending Prescriptions Disp Refills    cyanocobalamin 1000 MCG/ML injection 3 mL 5     Sig: Inject 1 mL into the muscle every 30 days    estradiol (ALEXX) 0.075 MG/24HR 8 patch 3     Sig: Place 1 patch onto the skin every 7 days    gabapentin (NEURONTIN) 300 MG capsule 180 capsule 1     Sig: Take 1 capsule by mouth 2 times daily for 247 days. Max Daily Amount: 600 mg    nebivolol (BYSTOLIC) 5 MG tablet 90 tablet 2     Sig: Take 1 tablet by mouth daily    venlafaxine (EFFEXOR XR) 37.5 MG extended release capsule 90 capsule 1     Sig: Take 1 capsule by mouth daily    vitamin D3 (CHOLECALCIFEROL) 25 MCG (1000 UT) TABS tablet 90 tablet 2     Sig: Take 1 tablet by mouth daily    metroNIDAZOLE (METROCREAM) 0.75 % cream 45 g 5     Sig: Apply topically 2 times daily.

## 2024-12-06 ENCOUNTER — PATIENT MESSAGE (OUTPATIENT)
Age: 64
End: 2024-12-06

## 2024-12-06 DIAGNOSIS — Z29.89 NEED FOR MALARIA PROPHYLAXIS: Primary | ICD-10-CM

## 2024-12-06 RX ORDER — ATOVAQUONE AND PROGUANIL HYDROCHLORIDE 250; 100 MG/1; MG/1
1 TABLET, FILM COATED ORAL DAILY
Qty: 30 TABLET | Refills: 0 | Status: SHIPPED | OUTPATIENT
Start: 2024-12-06

## 2024-12-10 ENCOUNTER — TELEPHONE (OUTPATIENT)
Age: 64
End: 2024-12-10

## 2024-12-10 NOTE — TELEPHONE ENCOUNTER
Kaylene Patch have been discontinued. The replacement would Estradiol patch 0.075 patch.  Please call [phone] - [facility].

## 2025-02-13 DIAGNOSIS — I10 ESSENTIAL (PRIMARY) HYPERTENSION: ICD-10-CM

## 2025-02-13 DIAGNOSIS — Z78.0 ASYMPTOMATIC MENOPAUSAL STATE: ICD-10-CM

## 2025-02-13 DIAGNOSIS — R23.2 FLUSHING: ICD-10-CM

## 2025-02-14 RX ORDER — NEBIVOLOL 5 MG/1
5 TABLET ORAL DAILY
Qty: 90 TABLET | Refills: 2 | Status: SHIPPED | OUTPATIENT
Start: 2025-02-14

## 2025-02-14 RX ORDER — VENLAFAXINE HYDROCHLORIDE 37.5 MG/1
37.5 CAPSULE, EXTENDED RELEASE ORAL DAILY
Qty: 90 CAPSULE | Refills: 1 | Status: SHIPPED | OUTPATIENT
Start: 2025-02-14

## 2025-08-12 DIAGNOSIS — Z78.0 ASYMPTOMATIC MENOPAUSAL STATE: ICD-10-CM

## 2025-08-12 DIAGNOSIS — R23.2 FLUSHING: ICD-10-CM

## 2025-08-12 RX ORDER — VENLAFAXINE HYDROCHLORIDE 37.5 MG/1
CAPSULE, EXTENDED RELEASE ORAL DAILY
Qty: 90 CAPSULE | Refills: 1 | Status: SHIPPED | OUTPATIENT
Start: 2025-08-12